# Patient Record
Sex: FEMALE | Race: WHITE | Employment: UNEMPLOYED | URBAN - NONMETROPOLITAN AREA
[De-identification: names, ages, dates, MRNs, and addresses within clinical notes are randomized per-mention and may not be internally consistent; named-entity substitution may affect disease eponyms.]

---

## 2017-01-11 ENCOUNTER — NURSE TRIAGE (OUTPATIENT)
Dept: ADMINISTRATIVE | Age: 57
End: 2017-01-11

## 2018-12-11 LAB
ALBUMIN SERPL-MCNC: 4.3 G/DL
ALP BLD-CCNC: 60 U/L
ALT SERPL-CCNC: 50 U/L
ANION GAP SERPL CALCULATED.3IONS-SCNC: 18 MMOL/L
AST SERPL-CCNC: 60 U/L
BASOPHILS ABSOLUTE: 0.07 /ΜL
BASOPHILS RELATIVE PERCENT: 1.1 %
BILIRUB SERPL-MCNC: 0.5 MG/DL (ref 0.1–1.4)
BUN BLDV-MCNC: 28 MG/DL
CALCIUM SERPL-MCNC: 9.8 MG/DL
CHLORIDE BLD-SCNC: 102 MMOL/L
CHOLESTEROL, TOTAL: 423 MG/DL
CHOLESTEROL/HDL RATIO: NORMAL
CO2: 24 MMOL/L
CREAT SERPL-MCNC: 2.5 MG/DL
EOSINOPHILS ABSOLUTE: 0.08 /ΜL
EOSINOPHILS RELATIVE PERCENT: 1.3 %
GFR CALCULATED: 21
GLUCOSE BLD-MCNC: 84 MG/DL
HCT VFR BLD CALC: 36.3 % (ref 36–46)
HDLC SERPL-MCNC: 54 MG/DL (ref 35–70)
HEMOGLOBIN: 11.6 G/DL (ref 12–16)
LDL CHOLESTEROL CALCULATED: NORMAL MG/DL (ref 0–160)
LYMPHOCYTES ABSOLUTE: 1.23 /ΜL
LYMPHOCYTES RELATIVE PERCENT: 20.1 %
MCH RBC QN AUTO: 32.8 PG
MCHC RBC AUTO-ENTMCNC: 32 G/DL
MCV RBC AUTO: 102.8 FL
MONOCYTES ABSOLUTE: 0.47 /ΜL
MONOCYTES RELATIVE PERCENT: 7.7 %
NEUTROPHILS ABSOLUTE: 4.25 /ΜL
NEUTROPHILS RELATIVE PERCENT: 69.5 %
PLATELET # BLD: 229 K/ΜL
PMV BLD AUTO: 11.8 FL
POTASSIUM SERPL-SCNC: 4.1 MMOL/L
RBC # BLD: 3.53 10^6/ΜL
SODIUM BLD-SCNC: 140 MMOL/L
T3 FREE: <0
T4 FREE: 0.27
TOTAL PROTEIN: 8.2
TRIGL SERPL-MCNC: 231 MG/DL
VLDLC SERPL CALC-MCNC: 46 MG/DL
WBC # BLD: 6.12 10^3/ML

## 2019-01-02 LAB
CREATININE, URINE: 25.4
MICROALBUMIN/CREAT 24H UR: 12.9 MG/G{CREAT}
MICROALBUMIN/CREAT UR-RTO: 50.8
MICROSCOPIC URINE: NORMAL

## 2019-01-28 LAB
ALBUMIN SERPL-MCNC: 4.1 G/DL
ALP BLD-CCNC: 71 U/L
ALT SERPL-CCNC: 18 U/L
ANION GAP SERPL CALCULATED.3IONS-SCNC: 15 MMOL/L
AST SERPL-CCNC: 27 U/L
BASOPHILS ABSOLUTE: 0.08 /ΜL
BASOPHILS RELATIVE PERCENT: 1.1 %
BILIRUB SERPL-MCNC: 0.4 MG/DL (ref 0.1–1.4)
BUN BLDV-MCNC: 25 MG/DL
CALCIUM SERPL-MCNC: 9.9 MG/DL
CHLORIDE BLD-SCNC: 103 MMOL/L
CO2: 27 MMOL/L
CREAT SERPL-MCNC: 1.9 MG/DL
EOSINOPHILS ABSOLUTE: 0.21 /ΜL
EOSINOPHILS RELATIVE PERCENT: 3 %
GFR CALCULATED: 29
GLUCOSE BLD-MCNC: 93 MG/DL
HCT VFR BLD CALC: 38.8 % (ref 36–46)
HEMOGLOBIN: 12.2 G/DL (ref 12–16)
LYMPHOCYTES ABSOLUTE: 1.4 /ΜL
LYMPHOCYTES RELATIVE PERCENT: 20 %
MCH RBC QN AUTO: 32.6 PG
MCHC RBC AUTO-ENTMCNC: 31.4 G/DL
MCV RBC AUTO: 103.7 FL
MONOCYTES ABSOLUTE: 0.61 /ΜL
MONOCYTES RELATIVE PERCENT: 8.7 %
NEUTROPHILS ABSOLUTE: 4.67 /ΜL
NEUTROPHILS RELATIVE PERCENT: 66.9 %
PLATELET # BLD: 240 K/ΜL
PMV BLD AUTO: 12 FL
POTASSIUM SERPL-SCNC: 4.5 MMOL/L
RBC # BLD: 3.74 10^6/ΜL
SODIUM BLD-SCNC: 140 MMOL/L
T4 FREE: 0.37
TOTAL PROTEIN: 8.4
WBC # BLD: 6.99 10^3/ML

## 2019-03-18 LAB
ALBUMIN SERPL-MCNC: 4.5 G/DL
ALP BLD-CCNC: 70 U/L
ALT SERPL-CCNC: 11 U/L
ANION GAP SERPL CALCULATED.3IONS-SCNC: 11 MMOL/L
AST SERPL-CCNC: 15 U/L
BILIRUB SERPL-MCNC: 0.3 MG/DL (ref 0.1–1.4)
BUN BLDV-MCNC: 24 MG/DL
CALCIUM SERPL-MCNC: 10 MG/DL
CHLORIDE BLD-SCNC: 105 MMOL/L
CHOLESTEROL, TOTAL: 157 MG/DL
CHOLESTEROL/HDL RATIO: 2.9
CO2: 27 MMOL/L
CREAT SERPL-MCNC: 1.7 MG/DL
GFR CALCULATED: NORMAL
GLUCOSE BLD-MCNC: 91 MG/DL
HDLC SERPL-MCNC: 53 MG/DL (ref 35–70)
LDL CHOLESTEROL CALCULATED: 87 MG/DL (ref 0–160)
POTASSIUM SERPL-SCNC: 4.6 MMOL/L
SODIUM BLD-SCNC: 143 MMOL/L
T4 FREE: 1.01
TOTAL PROTEIN: 6.9
TRIGL SERPL-MCNC: 83 MG/DL
VLDLC SERPL CALC-MCNC: 17 MG/DL

## 2019-04-17 ENCOUNTER — OFFICE VISIT (OUTPATIENT)
Dept: FAMILY MEDICINE CLINIC | Age: 59
End: 2019-04-17
Payer: MEDICARE

## 2019-04-17 VITALS
DIASTOLIC BLOOD PRESSURE: 78 MMHG | SYSTOLIC BLOOD PRESSURE: 124 MMHG | HEART RATE: 99 BPM | WEIGHT: 149.4 LBS | TEMPERATURE: 97.9 F | OXYGEN SATURATION: 99 %

## 2019-04-17 DIAGNOSIS — E78.2 MIXED HYPERLIPIDEMIA: ICD-10-CM

## 2019-04-17 DIAGNOSIS — E03.4 HYPOTHYROIDISM DUE TO ACQUIRED ATROPHY OF THYROID: ICD-10-CM

## 2019-04-17 DIAGNOSIS — Z11.4 SCREENING FOR HIV (HUMAN IMMUNODEFICIENCY VIRUS): ICD-10-CM

## 2019-04-17 DIAGNOSIS — Z23 NEED FOR TETANUS, DIPHTHERIA, AND ACELLULAR PERTUSSIS (TDAP) VACCINE IN PATIENT OF ADOLESCENT AGE OR OLDER: ICD-10-CM

## 2019-04-17 DIAGNOSIS — Z11.59 ENCOUNTER FOR HEPATITIS C SCREENING TEST FOR LOW RISK PATIENT: Primary | ICD-10-CM

## 2019-04-17 DIAGNOSIS — M17.0 PRIMARY OSTEOARTHRITIS OF BOTH KNEES: ICD-10-CM

## 2019-04-17 DIAGNOSIS — K21.00 GASTROESOPHAGEAL REFLUX DISEASE WITH ESOPHAGITIS: ICD-10-CM

## 2019-04-17 DIAGNOSIS — Z12.11 ENCOUNTER FOR SCREENING FOR MALIGNANT NEOPLASM OF COLON: ICD-10-CM

## 2019-04-17 DIAGNOSIS — Z78.0 POST-MENOPAUSAL: ICD-10-CM

## 2019-04-17 DIAGNOSIS — Z12.31 ENCOUNTER FOR SCREENING MAMMOGRAM FOR BREAST CANCER: ICD-10-CM

## 2019-04-17 PROCEDURE — 3017F COLORECTAL CA SCREEN DOC REV: CPT | Performed by: NURSE PRACTITIONER

## 2019-04-17 PROCEDURE — G8427 DOCREV CUR MEDS BY ELIG CLIN: HCPCS | Performed by: NURSE PRACTITIONER

## 2019-04-17 PROCEDURE — 99202 OFFICE O/P NEW SF 15 MIN: CPT | Performed by: NURSE PRACTITIONER

## 2019-04-17 PROCEDURE — G8421 BMI NOT CALCULATED: HCPCS | Performed by: NURSE PRACTITIONER

## 2019-04-17 PROCEDURE — 4004F PT TOBACCO SCREEN RCVD TLK: CPT | Performed by: NURSE PRACTITIONER

## 2019-04-17 RX ORDER — CALCIUM CARBONATE 200(500)MG
1 TABLET,CHEWABLE ORAL PRN
COMMUNITY

## 2019-04-17 RX ORDER — LEVOTHYROXINE SODIUM 0.1 MG/1
100 TABLET ORAL DAILY
COMMUNITY
Start: 2019-03-30 | End: 2019-04-17 | Stop reason: SDUPTHER

## 2019-04-17 RX ORDER — OMEPRAZOLE 20 MG/1
20 CAPSULE, DELAYED RELEASE ORAL PRN
COMMUNITY
Start: 2019-03-30 | End: 2019-12-02 | Stop reason: SDUPTHER

## 2019-04-17 RX ORDER — LEVOTHYROXINE SODIUM 0.1 MG/1
100 TABLET ORAL DAILY
Qty: 90 TABLET | Refills: 1 | Status: SHIPPED | OUTPATIENT
Start: 2019-04-17 | End: 2019-08-02 | Stop reason: SDUPTHER

## 2019-04-17 RX ORDER — ATORVASTATIN CALCIUM 10 MG/1
10 TABLET, FILM COATED ORAL DAILY
COMMUNITY
Start: 2019-03-30 | End: 2019-07-01 | Stop reason: SDUPTHER

## 2019-04-17 ASSESSMENT — ENCOUNTER SYMPTOMS
EYE ITCHING: 0
SHORTNESS OF BREATH: 0
EYE PAIN: 0
CHEST TIGHTNESS: 0
EYE REDNESS: 0
COUGH: 1
GASTROINTESTINAL NEGATIVE: 1
WHEEZING: 0
ALLERGIC/IMMUNOLOGIC NEGATIVE: 1

## 2019-04-17 ASSESSMENT — PATIENT HEALTH QUESTIONNAIRE - PHQ9
2. FEELING DOWN, DEPRESSED OR HOPELESS: 0
1. LITTLE INTEREST OR PLEASURE IN DOING THINGS: 0
SUM OF ALL RESPONSES TO PHQ9 QUESTIONS 1 & 2: 0
SUM OF ALL RESPONSES TO PHQ QUESTIONS 1-9: 0
SUM OF ALL RESPONSES TO PHQ QUESTIONS 1-9: 0

## 2019-04-17 NOTE — PROGRESS NOTES
[address] [facility]  [address] [place] [address]. Fozia Simon [address]  Dept: [phone]  Dept Fax: [phone]  Loc: [phone]     Visit Date:  4/17/2019    Patient:  Miguel Campbell  YOB: 1960    HPI:     Chief Complaint   Patient presents with    New Patient     Establish Care    Thyroid Problem     Medication Refill       HPI  Here for establishment  Hx of thyroid issues  tsh and t4 have been stable  Was working previously with Dr Coral Lux and DR Melany Fragoso   On disability for knee OA and frequent falls    Has arthritis of knees  Has heartburn if eating tomato products  Using GERD medications for some time now  Heartburn at least 3x per month; knows her triggers. No preventative care x 9 years since receiving her disability. Worked in [employer]  Has been disabled for some  Has quit driving  Dtr has bought her a cane and walker due to her frequent falls. No falls in the last month. REcently moved to [address] [place] [address] to be closer to family. Hx of kidney stones    Appetite is good  Bowels are moving well  No hysterectomy; 5 years ago was last pap    Medications    Current Outpatient Medications:     atorvastatin (LIPITOR) 10 MG tablet, Take 10 mg by mouth daily, Disp: , Rfl:     omeprazole (PRILOSEC) 20 MG delayed release capsule, Take 20 mg by mouth as needed, Disp: , Rfl:     diphenhydrAMINE-APAP, sleep, (TYLENOL PM EXTRA STRENGTH PO), Take by mouth nightly, Disp: , Rfl:     calcium carbonate (TUMS) 500 MG chewable tablet, Take 1 tablet by mouth as needed for Heartburn, Disp: , Rfl:     levothyroxine (SYNTHROID) 100 MCG tablet, Take 1 tablet by mouth daily, Disp: 90 tablet, Rfl: 1    Ketoprofen 10 % CREA, Apply 1 g topically nightly, Disp: 1 Tube, Rfl: 5    The patient is allergic to tramadol. Past Medical History  Nickolas Keane  has a past medical history of Anemia, Arthritis, and Kidney stone.     Past Surgical History  The patient  has a past surgical history that includes Bunionectomy (Bilateral) and Finger amputation (Left). Family History  This patient's family history includes Brain Cancer in her sister; Breast Cancer in her mother; Hypertension in her father; Other in her father; Stroke in her father. Social History   Mahendra  reports that she has been smoking cigarettes. She has been smoking about 1.00 pack per day. She has never used smokeless tobacco.    Health Maintenance:    Health Maintenance   Topic Date Due    Hepatitis C screen  1960    Pneumococcal 0-64 years Vaccine (1 of 1 - PPSV23) 09/01/1964    HIV screen  09/01/1973    DTaP/Tdap/Td vaccine (1 - Tdap) 09/01/1977    Cervical cancer screen  09/01/1979    Breast cancer screen  09/01/1998    Shingles Vaccine (1 of 2) 09/01/2008    Colon cancer screen colonoscopy  09/01/2008    Flu vaccine (Season Ended) 09/01/2019    Lipid screen  03/18/2024       Subjective:       Review of Systems   Constitutional: Positive for fatigue. Negative for activity change, appetite change and fever. HENT: Negative. Eyes: Negative for pain, redness and itching. Respiratory: Positive for cough. Negative for chest tightness, shortness of breath and wheezing. Cardiovascular: Positive for leg swelling. Negative for chest pain and palpitations. Gastrointestinal: Negative. Endocrine: Negative. Genitourinary: Positive for frequency. Negative for difficulty urinating. Musculoskeletal: Positive for arthralgias, gait problem and myalgias. Negative for neck pain and neck stiffness. Skin: Negative. Allergic/Immunologic: Negative. Neurological: Positive for light-headedness. Negative for dizziness, weakness and headaches. Hematological: Negative for adenopathy. Does not bruise/bleed easily. Psychiatric/Behavioral: Negative.         Objective:     /78 (Site: Right Upper Arm, Position: Sitting, Cuff Size: Small Adult)   Pulse 99

## 2019-04-17 NOTE — PATIENT INSTRUCTIONS
Patient Education        Arthritis: Care Instructions  Your Care Instructions  Arthritis, also called osteoarthritis, is a breakdown of the cartilage that cushions your joints. When the cartilage wears down, your bones rub against each other. This causes pain and stiffness. Many people have some arthritis as they age. Arthritis most often affects the joints of the spine, hands, hips, knees, or feet. You can take simple measures to protect your joints, ease your pain, and help you stay active. Follow-up care is a key part of your treatment and safety. Be sure to make and go to all appointments, and call your doctor if you are having problems. It's also a good idea to know your test results and keep a list of the medicines you take. How can you care for yourself at home? · Stay at a healthy weight. Being overweight puts extra strain on your joints. · Talk to your doctor or physical therapist about exercises that will help ease joint pain. ? Stretch. You may enjoy gentle forms of yoga to help keep your joints and muscles flexible. ? Walk instead of jog. Other types of exercise that are less stressful on the joints include riding a bicycle, swimming, sita chi, or water exercise. ? Lift weights. Strong muscles help reduce stress on your joints. Stronger thigh muscles, for example, take some of the stress off of the knees and hips. Learn the right way to lift weights so you do not make joint pain worse. · Take your medicines exactly as prescribed. Call your doctor if you think you are having a problem with your medicine. · Take pain medicines exactly as directed. ? If the doctor gave you a prescription medicine for pain, take it as prescribed. ? If you are not taking a prescription pain medicine, ask your doctor if you can take an over-the-counter medicine. · Use a cane, crutch, walker, or another device if you need help to get around. These can help rest your joints.  You also can use other things to make life easier, such as a higher toilet seat and padded handles on kitchen utensils. · Do not sit in low chairs, which can make it hard to get up. · Put heat or cold on your sore joints as needed. Use whichever helps you most. You also can take turns with hot and cold packs. ? Apply heat 2 or 3 times a day for 20 to 30 minutes--using a heating pad, hot shower, or hot pack--to relieve pain and stiffness. ? Put ice or a cold pack on your sore joint for 10 to 20 minutes at a time. Put a thin cloth between the ice and your skin. When should you call for help? Call your doctor now or seek immediate medical care if:    · You have sudden swelling, warmth, or pain in any joint.     · You have joint pain and a fever or rash.     · You have such bad pain that you cannot use a joint.    Watch closely for changes in your health, and be sure to contact your doctor if:    · You have mild joint symptoms that continue even with more than 6 weeks of care at home.     · You have stomach pain or other problems with your medicine. Where can you learn more? Go to https://[employer].McPhy. org and sign in to your Extra[employer]ie account. Enter F615 in the [employer] box to learn more about \"Arthritis: Care Instructions. \"     If you do not have an account, please click on the \"Sign Up Now\" link. Current as of: Melony 10, 2018  Content Version: 11.9  © 2605-2125 [employer]. Care instructions adapted under license by [facility]. If you have questions about a medical condition or this instruction, always ask your healthcare professional. [place] [address] any warranty or liability for your use of this information. Patient Education        Thyroid Hormone Tests: About This Test  What is it? Thyroid hormone tests are blood tests that check how well your thyroid gland is working.  The thyroid gland is a butterfly-shaped gland that lies in front of your windpipe (trachea), just below your voice box (larynx). The thyroid gland makes hormones that control the way your body uses energy (metabolism). This test will give your doctor information about your thyroid hormone levels. You may have hyperthyroidism when your thyroid makes too much thyroid hormone. You may have hypothyroidism when your body does not make enough thyroid hormone. Why is this test done? Thyroid hormone tests are done to:  · Find the cause of an abnormal thyroid-stimulating hormone (TSH) test, which is a blood test that checks for thyroid gland problems. · Check how well treatment for thyroid disease is working. · Test a  to find out if his or her thyroid gland is working as it should. How can you prepare for the test?  Talk to your doctor about all your health conditions before the test. For example, tell your doctor about all medicines you take. If you are taking thyroid medicines, tell your doctor when you took your last dose. You may need to stop taking thyroid medicines for a short time before having this test.  What happens during the test?  Blood test  A health professional will take a sample of your blood. Heel stick  A 's heel is pricked with a sharp, short needle (lancet). Then, several drops of blood are collected for the test. Your child may have a tiny bruise where his or her heel was pricked. What happens after the test?  You can go back to your usual activities right away. When should you call for help? Watch closely for changes in your health, and be sure to contact your doctor if you have any questions about this test.  Follow-up care is a key part of your treatment and safety. Be sure to make and go to all appointments, and call your doctor if you are having problems. It's also a good idea to keep a list of the medicines you take. Ask your doctor when you can expect to have your test results. Where can you learn more? Go to https://christine.health-partners. org and sign in to your Vide[employer] account. Enter 0699 [id number] in the [employer] box to learn more about \"Thyroid Hormone Tests: About This Test.\"     If you do not have an account, please click on the \"Sign Up Now\" link. Current as of: March 14, 2018  Content Version: 11.9  © 9235-6606 [employer]. Care instructions adapted under license by [facility] ([facility]). If you have questions about a medical condition or this instruction, always ask your healthcare professional. [address] any warranty or liability for your use of this information.

## 2019-07-01 DIAGNOSIS — E78.2 MIXED HYPERLIPIDEMIA: Primary | ICD-10-CM

## 2019-07-01 RX ORDER — ATORVASTATIN CALCIUM 10 MG/1
10 TABLET, FILM COATED ORAL DAILY
Qty: 30 TABLET | Refills: 5 | Status: SHIPPED | OUTPATIENT
Start: 2019-07-01 | End: 2019-08-02 | Stop reason: SDUPTHER

## 2019-07-19 ENCOUNTER — NURSE ONLY (OUTPATIENT)
Dept: FAMILY MEDICINE CLINIC | Age: 59
End: 2019-07-19
Payer: MEDICARE

## 2019-07-19 DIAGNOSIS — E78.2 MIXED HYPERLIPIDEMIA: ICD-10-CM

## 2019-07-19 DIAGNOSIS — Z11.4 SCREENING FOR HIV (HUMAN IMMUNODEFICIENCY VIRUS): ICD-10-CM

## 2019-07-19 DIAGNOSIS — Z11.59 ENCOUNTER FOR HEPATITIS C SCREENING TEST FOR LOW RISK PATIENT: ICD-10-CM

## 2019-07-19 DIAGNOSIS — E03.4 HYPOTHYROIDISM DUE TO ACQUIRED ATROPHY OF THYROID: ICD-10-CM

## 2019-07-19 LAB
ALBUMIN SERPL-MCNC: 4.4 G/DL (ref 3.5–5.1)
ALP BLD-CCNC: 103 U/L (ref 38–126)
ALT SERPL-CCNC: 9 U/L (ref 11–66)
AST SERPL-CCNC: 14 U/L (ref 5–40)
BILIRUB SERPL-MCNC: 0.3 MG/DL (ref 0.3–1.2)
BILIRUBIN DIRECT: < 0.2 MG/DL (ref 0–0.3)
CHOLESTEROL, TOTAL: 206 MG/DL (ref 100–199)
HDLC SERPL-MCNC: 61 MG/DL
HEPATITIS C ANTIBODY: NEGATIVE
LDL CHOLESTEROL CALCULATED: 121 MG/DL
T4 FREE: 0.96 NG/DL (ref 0.93–1.76)
TOTAL PROTEIN: 7.3 G/DL (ref 6.1–8)
TRIGL SERPL-MCNC: 121 MG/DL (ref 0–199)
TSH SERPL DL<=0.05 MIU/L-ACNC: 27.41 UIU/ML (ref 0.4–4.2)

## 2019-07-19 PROCEDURE — 36415 COLL VENOUS BLD VENIPUNCTURE: CPT | Performed by: NURSE PRACTITIONER

## 2019-07-21 LAB — HIV-2 AB: NEGATIVE

## 2019-08-02 ENCOUNTER — OFFICE VISIT (OUTPATIENT)
Dept: FAMILY MEDICINE CLINIC | Age: 59
End: 2019-08-02
Payer: MEDICARE

## 2019-08-02 VITALS
OXYGEN SATURATION: 99 % | WEIGHT: 154.8 LBS | HEART RATE: 96 BPM | TEMPERATURE: 97.5 F | SYSTOLIC BLOOD PRESSURE: 140 MMHG | DIASTOLIC BLOOD PRESSURE: 72 MMHG

## 2019-08-02 DIAGNOSIS — K21.00 GASTROESOPHAGEAL REFLUX DISEASE WITH ESOPHAGITIS: ICD-10-CM

## 2019-08-02 DIAGNOSIS — E78.2 MIXED HYPERLIPIDEMIA: ICD-10-CM

## 2019-08-02 DIAGNOSIS — E03.4 HYPOTHYROIDISM DUE TO ACQUIRED ATROPHY OF THYROID: ICD-10-CM

## 2019-08-02 DIAGNOSIS — M17.0 PRIMARY OSTEOARTHRITIS OF BOTH KNEES: ICD-10-CM

## 2019-08-02 DIAGNOSIS — M79.604 MUSCULOSKELETAL LEG PAIN, RIGHT: ICD-10-CM

## 2019-08-02 DIAGNOSIS — M54.12 CERVICAL RADICULOPATHY: Primary | ICD-10-CM

## 2019-08-02 PROCEDURE — G8421 BMI NOT CALCULATED: HCPCS | Performed by: NURSE PRACTITIONER

## 2019-08-02 PROCEDURE — 3017F COLORECTAL CA SCREEN DOC REV: CPT | Performed by: NURSE PRACTITIONER

## 2019-08-02 PROCEDURE — 4004F PT TOBACCO SCREEN RCVD TLK: CPT | Performed by: NURSE PRACTITIONER

## 2019-08-02 PROCEDURE — G8427 DOCREV CUR MEDS BY ELIG CLIN: HCPCS | Performed by: NURSE PRACTITIONER

## 2019-08-02 PROCEDURE — 99214 OFFICE O/P EST MOD 30 MIN: CPT | Performed by: NURSE PRACTITIONER

## 2019-08-02 RX ORDER — CELECOXIB 100 MG/1
100 CAPSULE ORAL 2 TIMES DAILY
Qty: 60 CAPSULE | Refills: 1 | Status: SHIPPED | OUTPATIENT
Start: 2019-08-02 | End: 2019-09-23 | Stop reason: SDUPTHER

## 2019-08-02 RX ORDER — LEVOTHYROXINE SODIUM 0.1 MG/1
100 TABLET ORAL DAILY
Qty: 90 TABLET | Refills: 1 | Status: SHIPPED | OUTPATIENT
Start: 2019-08-02 | End: 2020-02-21 | Stop reason: SDUPTHER

## 2019-08-02 RX ORDER — ATORVASTATIN CALCIUM 10 MG/1
10 TABLET, FILM COATED ORAL DAILY
Qty: 90 TABLET | Refills: 1 | Status: SHIPPED | OUTPATIENT
Start: 2019-08-02 | End: 2020-05-11 | Stop reason: SDUPTHER

## 2019-08-02 RX ORDER — CYCLOBENZAPRINE HCL 5 MG
5 TABLET ORAL NIGHTLY PRN
Qty: 30 TABLET | Refills: 0 | Status: SHIPPED | OUTPATIENT
Start: 2019-08-02 | End: 2019-08-12

## 2019-08-02 ASSESSMENT — ENCOUNTER SYMPTOMS
CONSTIPATION: 0
EYE PAIN: 0
CHEST TIGHTNESS: 0
NAUSEA: 0
DIARRHEA: 0
SORE THROAT: 0
APNEA: 0
PHOTOPHOBIA: 0
FACIAL SWELLING: 0
SHORTNESS OF BREATH: 0
BACK PAIN: 1
EYE DISCHARGE: 0
ABDOMINAL DISTENTION: 0

## 2019-08-02 NOTE — PROGRESS NOTES
[facility]  [address] [place] [address]. [facility] [address]  Dept: [phone]  Dept Fax: [phone]  Loc: [phone]     Visit Date:  8/2/2019    Patient:  Karen Moore  YOB: 1960    HPI:     Chief Complaint   Patient presents with    Other     4 Month Follow Up    Leg Pain     right leg, same issues as last visit    Hand Pain     left, has no        HPI  Here for follow up on her chronic conditions. Right thigh pain ongoing  Worse at night  Muscle is tight, continues to be withdrawn in appearance  Pain in right  Hip and right knee with laying down in bed. Pt has been sleeping in the chair  Epsom salt warm towels does help the pain,    New numbness, tingling and pain of the left 3-5th fingers. Previous injury  This pain and weakness is new. Cannot hold cup in the left hand  Pain with grasping her walker and her cane  Cannot raise arm above head. Pain worse in the morning with first getting up, does improve with standing.      Continues to have moderate to severe kyphosis    Medications    Current Outpatient Medications:     atorvastatin (LIPITOR) 10 MG tablet, Take 1 tablet by mouth daily, Disp: 90 tablet, Rfl: 1    levothyroxine (SYNTHROID) 100 MCG tablet, Take 1 tablet by mouth daily, Disp: 90 tablet, Rfl: 1    celecoxib (CELEBREX) 100 MG capsule, Take 1 capsule by mouth 2 times daily, Disp: 60 capsule, Rfl: 1    cyclobenzaprine (FLEXERIL) 5 MG tablet, Take 1 tablet by mouth nightly as needed for Muscle spasms, Disp: 30 tablet, Rfl: 0    omeprazole (PRILOSEC) 20 MG delayed release capsule, Take 20 mg by mouth as needed, Disp: , Rfl:     diphenhydrAMINE-APAP, sleep, (TYLENOL PM EXTRA STRENGTH PO), Take by mouth nightly, Disp: , Rfl:     calcium carbonate (TUMS) 500 MG chewable tablet, Take 1 tablet by mouth as needed for Heartburn, Disp: , Rfl:     Ketoprofen 10 % CREA, Apply 1 g topically nightly, Disp: 1 Tube, Rfl: 5    The patient is allergic to tramadol. Past Medical History  Rachele Cee  has a past medical history of Anemia, Arthritis, and Kidney stone. Past Surgical History  The patient  has a past surgical history that includes Bunionectomy (Bilateral) and Finger amputation (Left). Family History  This patient's family history includes Brain Cancer in her sister; Breast Cancer in her mother; Hypertension in her father; Other in her father; Stroke in her father. Social History   Rachele Cee  reports that she has been smoking cigarettes. She has been smoking about 1.00 pack per day. She has never used smokeless tobacco.    Health Maintenance:    Health Maintenance   Topic Date Due    Pneumococcal 0-64 years Vaccine (1 of 1 - PPSV23) 09/01/1964    DTaP/Tdap/Td vaccine (1 - Tdap) 09/01/1977    Cervical cancer screen  09/01/1979    Breast cancer screen  09/01/1998    Shingles Vaccine (1 of 2) 09/01/2008    Colon cancer screen colonoscopy  09/01/2008    Flu vaccine (1) 09/01/2019    Lipid screen  07/19/2024    Hepatitis C screen  Completed    HIV screen  Completed       Subjective:       Review of Systems   Constitutional: Positive for activity change and appetite change. Negative for diaphoresis, fatigue and fever. HENT: Negative for congestion, facial swelling, postnasal drip and sore throat. Eyes: Negative for photophobia, pain, discharge and visual disturbance. Respiratory: Negative for apnea, chest tightness and shortness of breath. Cardiovascular: Negative for chest pain and leg swelling. Gastrointestinal: Negative for abdominal distention, constipation, diarrhea and nausea. Endocrine: Negative for cold intolerance and heat intolerance. Genitourinary: Negative for dysuria, flank pain, frequency and urgency. Musculoskeletal: Positive for arthralgias, back pain, joint swelling, myalgias and neck stiffness. Negative for neck pain. Skin: Negative for pallor and rash. patientinstructions. Discussed use, benefit, and side effects of prescribed medications. All patient questions answered. Pt voiced understanding. Reviewed health maintenance.        Electronically signed EMORY Anderson CNP on 8/2/2019 at 1:06 PM

## 2019-08-02 NOTE — PATIENT INSTRUCTIONS
Patient Education        Well Visit, Women 48 to 72: Care Instructions  Your Care Instructions    Physical exams can help you stay healthy. Your doctor has checked your overall health and may have suggested ways to take good care of yourself. He or she also may have recommended tests. At home, you can help prevent illness with healthy eating, regular exercise, and other steps. Follow-up care is a key part of your treatment and safety. Be sure to make and go to all appointments, and call your doctor if you are having problems. It's also a good idea to know your test results and keep a list of the medicines you take. How can you care for yourself at home? · Reach and stay at a healthy weight. This will lower your risk for many problems, such as obesity, diabetes, heart disease, and high blood pressure. · Get at least 30 minutes of exercise on most days of the week. Walking is a good choice. You also may want to do other activities, such as running, swimming, cycling, or playing tennis or team sports. · Do not smoke. Smoking can make health problems worse. If you need help quitting, talk to your doctor about stop-smoking programs and medicines. These can increase your chances of quitting for good. · Protect your skin from too much sun. When you're outdoors from 10 a.m. to 4 p.m., stay in the shade or cover up with clothing and a hat with a wide brim. Wear sunglasses that block UV rays. Even when it's cloudy, put broad-spectrum sunscreen (SPF 30 or higher) on any exposed skin. · See a dentist one or two times a year for checkups and to have your teeth cleaned. · Wear a seat belt in the car. Follow your doctor's advice about when to have certain tests. These tests can spot problems early. · Cholesterol. Your doctor will tell you how often to have this done based on your age, family history, or other things that can increase your risk for heart attack and stroke. · Blood pressure.  Have your blood pressure have blood tests and to make sure you are doing well. Follow-up care is a key part of your treatment and safety. Be sure to make and go to all appointments, and call your doctor if you are having problems. It's also a good idea to know your test results and keep a list of the medicines you take. How can you care for yourself at home? · Take your thyroid hormone medicine exactly as prescribed. Call your doctor if you think you are having a problem with your medicine. Most people do not have side effects if they take the right amount of medicine regularly. ? Take the medicine 30 minutes before breakfast, and do not take it with calcium, vitamins, or iron. ? Do not take extra doses of your thyroid medicine. It will not help you get better any faster, and it may cause side effects. ? If you forget to take a dose, do NOT take a double dose of medicine. Take your usual dose the next day. · Tell your doctor about all prescription, herbal, or over-the-counter products you take. · Take care of yourself. Eat a healthy diet, get enough sleep, and get regular exercise. When should you call for help? Call 911 anytime you think you may need emergency care. For example, call if:    · You passed out (lost consciousness).     · You have severe trouble breathing.     · You have a very slow heartbeat (less than 60 beats a minute).     · You have a low body temperature (95°F or below).    Call your doctor now or seek immediate medical care if:    · You feel tired, sluggish, or weak.     · You have trouble remembering things or concentrating.     · You do not begin to feel better 2 weeks after starting your medicine.    Watch closely for changes in your health, and be sure to contact your doctor if you have any problems. Where can you learn more? Go to https://chpeanastasiaeb.[employer]. org and sign in to your [employer] account.  Enter Y696 in the enStage box to learn more about \"Hypothyroidism: Care some people. If your symptoms are worse after you eat a certain food, you may want to stop eating that food to see if your symptoms get better. · Do not smoke or chew tobacco. Smoking can make GERD worse. If you need help quitting, talk to your doctor about stop-smoking programs and medicines. These can increase your chances of quitting for good. · If you have GERD symptoms at night, raise the head of your bed 6 to 8 inches by putting the frame on blocks or placing a foam wedge under the head of your mattress. (Adding extra pillows does not work.)  · Do not wear tight clothing around your middle. · Lose weight if you need to. Losing just 5 to 10 pounds can help. When should you call for help? Call your doctor now or seek immediate medical care if:    · You have new or different belly pain.     · Your stools are black and tarlike or have streaks of blood.    Watch closely for changes in your health, and be sure to contact your doctor if:    · Your symptoms have not improved after 2 days.     · Food seems to catch in your throat or chest.   Where can you learn more? Go to https://[employer].LAF[employer]O. org and sign in to your [employer] account. Enter Q851 in the Body & Soul box to learn more about \"Gastroesophageal Reflux Disease (GERD): Care Instructions. \"     If you do not have an account, please click on the \"Sign Up Now\" link. Current as of: November 7, 2018  Content Version: 12.0  © 6020-6211 [employer]. Care instructions adapted under license by [facility] ([facility]). If you have questions about a medical condition or this instruction, always ask your healthcare professional. [place] [address] any warranty or liability for your use of this information. Patient Education        Knee Arthritis: Exercises  Your Care Instructions  Here are some examples of exercises for knee arthritis. Start each exercise slowly.  Ease off the exercise if you start to have knees straight. If your kneecap is uncomfortable, roll up a washcloth and put it under your leg just above your kneecap. 2. Lift the foot of your affected leg by bending the knee so that you bring the foot up toward your buttock. If this motion hurts, try it without bending your knee quite as far. This may help you avoid any painful motion. 3. Slowly move your leg up and down. 4. Repeat 8 to 12 times. 5. Switch legs and repeat steps 1 through 4, even if only one knee is sore. Quadriceps stretch (facedown)    1. Lie flat on your stomach, and rest your face on the floor. 2. Wrap a towel or belt strap around the lower part of your affected leg. Then use the towel or belt strap to slowly pull your heel toward your buttock until you feel a stretch. 3. Hold for about 15 to 30 seconds, then relax your leg against the towel or belt strap. 4. Repeat 2 to 4 times. 5. Switch legs and repeat steps 1 through 4, even if only one knee is sore. Stationary exercise bike    1. If you do not have a stationary exercise bike at home, you can find one to ride at your local health club or community center. 2. Adjust the height of the bike seat so that your knee is slightly bent when your leg is extended downward. If your knee hurts when the pedal reaches the top, you can raise the seat so that your knee does not bend as much. 3. Start slowly. At first, try to do 5 to 10 minutes of cycling with little to no resistance. Then increase your time and the resistance bit by bit until you can do 20 to 30 minutes without pain. 4. If you start to have pain, rest your knee until your pain gets back to the level that is normal for you. Or cycle for less time or with less effort. Follow-up care is a key part of your treatment and safety. Be sure to make and go to all appointments, and call your doctor if you are having problems. It's also a good idea to know your test results and keep a list of the medicines you take.   Where can if you can take an over-the-counter medicine. · Try using a heating pad on a low or medium setting for 15 to 20 minutes every 2 or 3 hours. Try a warm shower in place of one session with the heating pad. You can also buy single-use heat wraps that last up to 8 hours. · You can also try an ice pack for 10 to 15 minutes every 2 to 3 hours. There isn't strong evidence that either heat or ice will help. But you can try them to see if they help you. · Don't spend too long in one position. Take short breaks to move around and change positions. · Wear a seat belt and shoulder harness when you are in a car. · Sleep with a pillow under your head and neck that keeps your neck straight. · If you were given a neck brace (cervical collar) to limit neck motion, wear it as instructed for as many days as your doctor tells you to. Do not wear it longer than you were told to. Wearing a brace for too long can lead to neck stiffness and can weaken the neck muscles. · Follow your doctor's instructions for gentle neck-stretching exercises. · Do not smoke. Smoking can slow healing of your discs. If you need help quitting, talk to your doctor about stop-smoking programs and medicines. These can increase your chances of quitting for good. · Avoid strenuous work or exercise until your doctor says it is okay. When should you call for help? Call 911 anytime you think you may need emergency care. For example, call if:    · You are unable to move an arm or a leg at all.   [facility] your doctor now or seek immediate medical care if:    · You have new or worse symptoms in your arms, legs, chest, belly, or buttocks. Symptoms may include:  ? Numbness or tingling. ? Weakness. ? Pain.     · You lose bladder or bowel control.    Watch closely for changes in your health, and be sure to contact your doctor if:    · You are not getting better as expected. Where can you learn more? Go to https://christine.healthMovableInk. org and sign in to

## 2019-08-13 ENCOUNTER — HOSPITAL ENCOUNTER (OUTPATIENT)
Age: 59
Discharge: HOME | End: 2019-08-13
Payer: MEDICARE

## 2019-08-13 ENCOUNTER — HOSPITAL ENCOUNTER (OUTPATIENT)
Dept: GENERAL RADIOLOGY | Age: 59
Discharge: HOME | End: 2019-08-13
Payer: MEDICARE

## 2019-08-13 ENCOUNTER — TELEPHONE (OUTPATIENT)
Dept: FAMILY MEDICINE CLINIC | Age: 59
End: 2019-08-13

## 2019-08-13 DIAGNOSIS — M79.604 MUSCULOSKELETAL LEG PAIN, RIGHT: ICD-10-CM

## 2019-08-13 DIAGNOSIS — M54.12 CERVICAL RADICULOPATHY: ICD-10-CM

## 2019-08-13 PROCEDURE — 73552 X-RAY EXAM OF FEMUR 2/>: CPT

## 2019-08-13 PROCEDURE — 72040 X-RAY EXAM NECK SPINE 2-3 VW: CPT

## 2019-08-13 PROCEDURE — 73502 X-RAY EXAM HIP UNI 2-3 VIEWS: CPT

## 2019-08-19 NOTE — TELEPHONE ENCOUNTER
I called the patient's daughter Vilma Beckman and received voicemail. I left a detailed message regarding Dayne Farris' response and if they would like to proceed or have any questions she can give our office a call back.

## 2019-08-19 NOTE — TELEPHONE ENCOUNTER
Would suggest MRI of neck and physical therapy if pt is interested. There is some suggestion of nerve impingement of the cervical spine. MRI would show the depth of degeneration, and if nerves are impinged.

## 2019-09-23 DIAGNOSIS — M17.0 PRIMARY OSTEOARTHRITIS OF BOTH KNEES: ICD-10-CM

## 2019-09-23 RX ORDER — CELECOXIB 100 MG/1
100 CAPSULE ORAL 2 TIMES DAILY
Qty: 180 CAPSULE | Refills: 1 | Status: SHIPPED
Start: 2019-09-23 | End: 2020-02-07 | Stop reason: SINTOL

## 2019-10-16 ENCOUNTER — HOSPITAL ENCOUNTER (OUTPATIENT)
Dept: WOMENS IMAGING | Age: 59
Discharge: HOME | End: 2019-10-16
Payer: MEDICARE

## 2019-10-16 ENCOUNTER — ANCILLARY ORDERS (OUTPATIENT)
Dept: FAMILY MEDICINE CLINIC | Age: 59
End: 2019-10-16

## 2019-10-16 DIAGNOSIS — Z78.0 POST-MENOPAUSAL: ICD-10-CM

## 2019-10-16 DIAGNOSIS — M85.89 OSTEOPENIA OF MULTIPLE SITES: Primary | ICD-10-CM

## 2019-10-16 PROCEDURE — 77080 DXA BONE DENSITY AXIAL: CPT

## 2019-10-18 ENCOUNTER — TELEPHONE (OUTPATIENT)
Dept: FAMILY MEDICINE CLINIC | Age: 59
End: 2019-10-18

## 2019-10-18 RX ORDER — ALENDRONATE SODIUM 70 MG/1
70 TABLET ORAL
Qty: 12 TABLET | Refills: 1 | Status: SHIPPED | OUTPATIENT
Start: 2019-10-18 | End: 2020-02-07 | Stop reason: SDUPTHER

## 2019-12-02 DIAGNOSIS — K21.00 GASTROESOPHAGEAL REFLUX DISEASE WITH ESOPHAGITIS: Primary | ICD-10-CM

## 2019-12-02 RX ORDER — OMEPRAZOLE 20 MG/1
20 CAPSULE, DELAYED RELEASE ORAL DAILY
Qty: 90 CAPSULE | Refills: 1 | Status: SHIPPED | OUTPATIENT
Start: 2019-12-02 | End: 2020-03-11 | Stop reason: SDUPTHER

## 2020-01-06 ENCOUNTER — TELEPHONE (OUTPATIENT)
Dept: FAMILY MEDICINE CLINIC | Age: 60
End: 2020-01-06

## 2020-01-06 NOTE — TELEPHONE ENCOUNTER
Adams Giron put in the orders for blood work. I faxed it to the [facility] on [facility] in [address] and received a fax confirmation back.

## 2020-01-06 NOTE — TELEPHONE ENCOUNTER
The patient called and was going to get blood work done on Thursday. I don't see any orders in her chart. Can you put orders in the you want the patient to have done?

## 2020-01-09 LAB
ALBUMIN SERPL-MCNC: 4.4 G/DL
ALP BLD-CCNC: 124 U/L
ALT SERPL-CCNC: 19 U/L
ANION GAP SERPL CALCULATED.3IONS-SCNC: 13 MMOL/L
AST SERPL-CCNC: 21 U/L
BASOPHILS ABSOLUTE: 0.1 /ΜL
BASOPHILS RELATIVE PERCENT: 1.4 %
BILIRUB SERPL-MCNC: 0.3 MG/DL (ref 0.1–1.4)
BUN BLDV-MCNC: 29 MG/DL
CALCIUM SERPL-MCNC: 9.6 MG/DL
CHLORIDE BLD-SCNC: 106 MMOL/L
CHOLESTEROL, TOTAL: 156 MG/DL
CHOLESTEROL/HDL RATIO: 2.6
CO2: 23 MMOL/L
CREAT SERPL-MCNC: 1.42 MG/DL
EOSINOPHILS ABSOLUTE: 0.2 /ΜL
EOSINOPHILS RELATIVE PERCENT: 2.2 %
GFR CALCULATED: 38
GLUCOSE BLD-MCNC: 76 MG/DL
HCT VFR BLD CALC: 39.1 % (ref 36–46)
HDLC SERPL-MCNC: 59 MG/DL (ref 35–70)
HEMOGLOBIN: 12.9 G/DL (ref 12–16)
LDL CHOLESTEROL CALCULATED: 78 MG/DL (ref 0–160)
LYMPHOCYTES ABSOLUTE: 1.9 /ΜL
LYMPHOCYTES RELATIVE PERCENT: 17.9 %
MCH RBC QN AUTO: 29.9 PG
MCHC RBC AUTO-ENTMCNC: 32.9 G/DL
MCV RBC AUTO: 91 FL
MONOCYTES ABSOLUTE: 0.8 /ΜL
MONOCYTES RELATIVE PERCENT: 7.7 %
NEUTROPHILS ABSOLUTE: 7.4 /ΜL
NEUTROPHILS RELATIVE PERCENT: 70.8 %
PDW BLD-RTO: 15.7 %
PLATELET # BLD: 302 K/ΜL
PMV BLD AUTO: 9.1 FL
POTASSIUM SERPL-SCNC: 4.9 MMOL/L
RBC # BLD: 4.3 10^6/ΜL
SODIUM BLD-SCNC: 142 MMOL/L
TOTAL PROTEIN: 7.6
TRIGL SERPL-MCNC: 93 MG/DL
VITAMIN D 25-HYDROXY: 32.3
VITAMIN D2, 25 HYDROXY: NORMAL
VITAMIN D3,25 HYDROXY: NORMAL
VLDLC SERPL CALC-MCNC: 19 MG/DL
WBC # BLD: 10.5 10^3/ML

## 2020-01-13 ENCOUNTER — TELEPHONE (OUTPATIENT)
Dept: FAMILY MEDICINE CLINIC | Age: 60
End: 2020-01-13

## 2020-01-13 NOTE — TELEPHONE ENCOUNTER
I called and spoke to the patient's daughter Lucy Najera. I let her know Laura's response to her mother's labs. Cindy voiced understanding and said that she will let her mom know. I mailed the lab order to the patient today.

## 2020-01-13 NOTE — TELEPHONE ENCOUNTER
----- Message from EMORY Tse CNP sent at 1/13/2020 10:09 AM EST -----  Labs are wnl except for the kidney function. Creatinine is still elevated. Cholesterol is all normal. Would like her to hold the celebrex for now and recheck kidney function in 4 weeks.

## 2020-02-06 LAB
ANION GAP SERPL CALCULATED.3IONS-SCNC: 14 MMOL/L (ref 5–15)
BUN BLDV-MCNC: 22 MG/DL (ref 5–27)
CALCIUM SERPL-MCNC: 9.4 MG/DL (ref 8.5–10.5)
CHLORIDE BLD-SCNC: 104 MMOL/L (ref 98–109)
CO2: 24 MMOL/L (ref 22–32)
CREAT SERPL-MCNC: 1.22 MG/DL (ref 0.4–1)
EGFR AFRICAN AMERICAN: 54 ML/MIN/1.73SQ.M
EGFR IF NONAFRICAN AMERICAN: 45 ML/MIN/1.73SQ.M
GLUCOSE: 103 MG/DL (ref 65–99)
POTASSIUM SERPL-SCNC: 4.5 MMOL/L (ref 3.5–5)
SODIUM BLD-SCNC: 142 MMOL/L (ref 134–146)

## 2020-02-07 ENCOUNTER — TELEPHONE (OUTPATIENT)
Dept: FAMILY MEDICINE CLINIC | Age: 60
End: 2020-02-07

## 2020-02-07 RX ORDER — ALENDRONATE SODIUM 70 MG/1
70 TABLET ORAL
Qty: 12 TABLET | Refills: 1 | Status: SHIPPED | OUTPATIENT
Start: 2020-02-07 | End: 2020-11-02 | Stop reason: ALTCHOICE

## 2020-02-07 NOTE — TELEPHONE ENCOUNTER
----- Message from EMORY Villegas CNP sent at 2/7/2020  8:14 AM EST -----  Have patient stop celebrex at this time. Kidney function is better but not where it should be. Be sure to increase fluids to 2 liters of water per day if possible. If pain persists, will consider pain medication. No ibuprofen or aleve at this time either. Can use tylenol arthritis 8 hour relief, and I would have her put this on a scheduled dosing.

## 2020-02-21 RX ORDER — LEVOTHYROXINE SODIUM 0.1 MG/1
100 TABLET ORAL DAILY
Qty: 90 TABLET | Refills: 0 | Status: SHIPPED | OUTPATIENT
Start: 2020-02-21 | End: 2020-05-11 | Stop reason: SDUPTHER

## 2020-03-04 ENCOUNTER — OFFICE VISIT (OUTPATIENT)
Dept: FAMILY MEDICINE CLINIC | Age: 60
End: 2020-03-04
Payer: MEDICARE

## 2020-03-04 VITALS
SYSTOLIC BLOOD PRESSURE: 130 MMHG | HEART RATE: 102 BPM | WEIGHT: 172.6 LBS | OXYGEN SATURATION: 99 % | TEMPERATURE: 97 F | DIASTOLIC BLOOD PRESSURE: 76 MMHG

## 2020-03-04 PROCEDURE — G8484 FLU IMMUNIZE NO ADMIN: HCPCS | Performed by: NURSE PRACTITIONER

## 2020-03-04 PROCEDURE — 4004F PT TOBACCO SCREEN RCVD TLK: CPT | Performed by: NURSE PRACTITIONER

## 2020-03-04 PROCEDURE — G8427 DOCREV CUR MEDS BY ELIG CLIN: HCPCS | Performed by: NURSE PRACTITIONER

## 2020-03-04 PROCEDURE — 3017F COLORECTAL CA SCREEN DOC REV: CPT | Performed by: NURSE PRACTITIONER

## 2020-03-04 PROCEDURE — G8421 BMI NOT CALCULATED: HCPCS | Performed by: NURSE PRACTITIONER

## 2020-03-04 PROCEDURE — 20610 DRAIN/INJ JOINT/BURSA W/O US: CPT | Performed by: NURSE PRACTITIONER

## 2020-03-04 RX ORDER — HYDROCODONE BITARTRATE AND ACETAMINOPHEN 5; 325 MG/1; MG/1
1 TABLET ORAL EVERY 8 HOURS PRN
Qty: 21 TABLET | Refills: 0 | Status: SHIPPED | OUTPATIENT
Start: 2020-03-04 | End: 2020-05-11 | Stop reason: SDUPTHER

## 2020-03-04 RX ORDER — BUPIVACAINE HYDROCHLORIDE 5 MG/ML
3 INJECTION, SOLUTION PERINEURAL ONCE
Status: COMPLETED | OUTPATIENT
Start: 2020-03-04 | End: 2020-03-04

## 2020-03-04 RX ORDER — TRIAMCINOLONE ACETONIDE 40 MG/ML
60 INJECTION, SUSPENSION INTRA-ARTICULAR; INTRAMUSCULAR ONCE
Status: COMPLETED | OUTPATIENT
Start: 2020-03-04 | End: 2020-03-04

## 2020-03-04 RX ADMIN — BUPIVACAINE HYDROCHLORIDE 15 MG: 5 INJECTION, SOLUTION PERINEURAL at 11:40

## 2020-03-04 RX ADMIN — TRIAMCINOLONE ACETONIDE 60 MG: 40 INJECTION, SUSPENSION INTRA-ARTICULAR; INTRAMUSCULAR at 11:40

## 2020-03-04 ASSESSMENT — ENCOUNTER SYMPTOMS
EYES NEGATIVE: 1
COUGH: 0
GASTROINTESTINAL NEGATIVE: 1
SHORTNESS OF BREATH: 0
ALLERGIC/IMMUNOLOGIC NEGATIVE: 1

## 2020-03-04 ASSESSMENT — PATIENT HEALTH QUESTIONNAIRE - PHQ9
1. LITTLE INTEREST OR PLEASURE IN DOING THINGS: 0
SUM OF ALL RESPONSES TO PHQ QUESTIONS 1-9: 0
2. FEELING DOWN, DEPRESSED OR HOPELESS: 0
SUM OF ALL RESPONSES TO PHQ9 QUESTIONS 1 & 2: 0
SUM OF ALL RESPONSES TO PHQ QUESTIONS 1-9: 0

## 2020-03-04 NOTE — PATIENT INSTRUCTIONS
Patient Education        Arthritis: Care Instructions  Your Care Instructions  Arthritis, also called osteoarthritis, is a breakdown of the cartilage that cushions your joints. When the cartilage wears down, your bones rub against each other. This causes pain and stiffness. Many people have some arthritis as they age. Arthritis most often affects the joints of the spine, hands, hips, knees, or feet. You can take simple measures to protect your joints, ease your pain, and help you stay active. Follow-up care is a key part of your treatment and safety. Be sure to make and go to all appointments, and call your doctor if you are having problems. It's also a good idea to know your test results and keep a list of the medicines you take. How can you care for yourself at home? · Stay at a healthy weight. Being overweight puts extra strain on your joints. · Talk to your doctor or physical therapist about exercises that will help ease joint pain. ? Stretch. You may enjoy gentle forms of yoga to help keep your joints and muscles flexible. ? Walk instead of jog. Other types of exercise that are less stressful on the joints include riding a bicycle, swimming, sita chi, or water exercise. ? Lift weights. Strong muscles help reduce stress on your joints. Stronger thigh muscles, for example, take some of the stress off of the knees and hips. Learn the right way to lift weights so you do not make joint pain worse. · Take your medicines exactly as prescribed. Call your doctor if you think you are having a problem with your medicine. · Take pain medicines exactly as directed. ? If the doctor gave you a prescription medicine for pain, take it as prescribed. ? If you are not taking a prescription pain medicine, ask your doctor if you can take an over-the-counter medicine. · Use a cane, crutch, walker, or another device if you need help to get around. These can help rest your joints.  You also can use other things to make tissue is damaged, the bones may rub against each other. And that causes pain. Cartilage doesn't heal easily on its own. The goal of treatment is to get your body to grow new cartilage in the damaged area. This is done by placing healthy cartilage cells into the area. Over time, the cells multiply and replace the damaged cartilage. The knee is the most common area for this type of surgery. Other areas include the ankle and the shoulder. How is the surgery done? You either will be asleep during the surgery or the area being worked on will be numb. Two common surgeries are:  · Osteochondral autologous transplantation (OATS). ? During surgery, the doctor removes the damaged cartilage. ? He or she also removes one or more samples, called plugs, of healthy bone and cartilage from another part of your body. Sometimes the plugs are taken from another donor instead of your own body. (This is called an allograft.)  ? The doctor cuts into your bone to make a socket, or hole, for each plug. Then the plug is inserted into the socket. · Autologous chondrocyte implantation (ACI). This procedure has two steps. ? Harvesting cells: The doctor takes healthy cartilage cells from one of your bones. You won't need to stay in the hospital for this. The cells are sent to a lab. In the lab, the cells increase in number. This takes several weeks. ? Surgery. When the cells are ready, you will have surgery. The doctor removes the damaged cartilage. Then the doctor pulls at the layer of tissue that covers your bones over the joint area. This creates a pocket. He or she injects the new cells into the pocket. These surgeries can be done in two ways:  · Arthroscopic surgery: If the damaged area is small, the doctor may use this method. It requires only small cuts (incisions). The doctor puts a lighted tube, called a scope, and other surgical tools through the cuts. The doctor is able to see the inside of your joint with the scope.  You may be able to go home the same day. · Open surgery: Your doctor will make one large cut over the joint. If you have open surgery, you will probably stay in the hospital overnight. What can you expect after surgery? You will need to wear a stiff brace for a short time. Then you will need crutches or a walker for a few months. Follow your doctor's instructions about how much weight you can put on the joint. You will start doing exercises while you're still in the hospital. And you will need to do weeks or months of physical rehabilitation. Rehab will help strengthen the muscles of your joint and help you regain movement. In time, most people are able to return to most of their normal activities, including sports. Follow-up care is a key part of your treatment and safety. Be sure to make and go to all appointments, and call your doctor if you are having problems. It's also a good idea to know your test results and keep a list of the medicines you take. Where can you learn more? Go to https://DeNovaMed.Musicshake. org and sign in to your [employer] account. Enter F275 in the YouHelp box to learn more about \"Learning About Surgery to [address] [place] [address]. \"     If you do not have an account, please click on the \"Sign Up Now\" link. Current as of: June 26, 2019  Content Version: 12.3  © 2391-9873 Healthwise, Incorporated. Care instructions adapted under license by [facility] ([facility]). If you have questions about a medical condition or this instruction, always ask your healthcare professional. [place] [address] any warranty or liability for your use of this information. Patient Education        Learning About Joint Injections  What are joint injections? Joint injections are shots into a joint, such as the knee or shoulder. They are used to put in medicines, such as pain relievers and steroid medicines.   Steroids can be injected directly into a swollen and painful joint If you do not have an account, please click on the \"Sign Up Now\" link. Current as of: June 26, 2019  Content Version: 12.3  © 4552-6250 Healthwise, [employer]. Care instructions adapted under license by [facility] ([facility]). If you have questions about a medical condition or this instruction, always ask your healthcare professional. [address] any warranty or liability for your use of this information. Patient Education        Knee Arthritis: Exercises  Introduction  Here are some examples of exercises for you to try. The exercises may be suggested for a condition or for rehabilitation. Start each exercise slowly. Ease off the exercises if you start to have pain. You will be told when to start these exercises and which ones will work best for you. How to do the exercises  Knee flexion with heel slide   1. Lie on your back with your knees bent. 2. Slide your heel back by bending your affected knee as far as you can. Then hook your other foot around your ankle to help pull your heel even farther back. 3. Hold for about 6 seconds, then rest for up to 10 seconds. 4. Repeat 8 to 12 times. 5. Switch legs and repeat steps 1 through 4, even if only one knee is sore. Quad sets   1. Sit with your affected leg straight and supported on the floor or a firm bed. Place a small, rolled-up towel under your knee. Your other leg should be bent, with that foot flat on the floor. 2. Tighten the thigh muscles of your affected leg by pressing the back of your knee down into the towel. 3. Hold for about 6 seconds, then rest for up to 10 seconds. 4. Repeat 8 to 12 times. 5. Switch legs and repeat steps 1 through 4, even if only one knee is sore. Straight-leg raises to the front   1. Lie on your back with your good knee bent so that your foot rests flat on the floor. Your affected leg should be straight. Make sure that your low back has a normal curve.  You should be able to slip your hand in between the floor and the small of your back, with your palm touching the floor and your back touching the back of your hand. 2. Tighten the thigh muscles in your affected leg by pressing the back of your knee flat down to the floor. Hold your knee straight. 3. Keeping the thigh muscles tight and your leg straight, lift your affected leg up so that your heel is about 12 inches off the floor. Hold for about 6 seconds, then lower slowly. 4. Relax for up to 10 seconds between repetitions. 5. Repeat 8 to 12 times. 6. Switch legs and repeat steps 1 through 5, even if only one knee is sore. Active knee flexion   1. Lie on your stomach with your knees straight. If your kneecap is uncomfortable, roll up a washcloth and put it under your leg just above your kneecap. 2. Lift the foot of your affected leg by bending the knee so that you bring the foot up toward your buttock. If this motion hurts, try it without bending your knee quite as far. This may help you avoid any painful motion. 3. Slowly move your leg up and down. 4. Repeat 8 to 12 times. 5. Switch legs and repeat steps 1 through 4, even if only one knee is sore. Quadriceps stretch (facedown)   1. Lie flat on your stomach, and rest your face on the floor. 2. Wrap a towel or belt strap around the lower part of your affected leg. Then use the towel or belt strap to slowly pull your heel toward your buttock until you feel a stretch. 3. Hold for about 15 to 30 seconds, then relax your leg against the towel or belt strap. 4. Repeat 2 to 4 times. 5. Switch legs and repeat steps 1 through 4, even if only one knee is sore. Stationary exercise bike   1. If you do not have a stationary exercise bike at home, you can find one to ride at your local health club or community center. 2. Adjust the height of the bike seat so that your knee is slightly bent when your leg is extended downward.  If your knee hurts when the pedal reaches the top, you can raise the may take longer if you have both knees done at the same time. Follow-up care is a key part of your treatment and safety. Be sure to make and go to all appointments, and call your doctor if you are having problems. It's also a good idea to know your test results and keep a list of the medicines you take. What happens before surgery?   Surgery can be stressful. This information will help you understand what you can expect. And it will help you safely prepare for surgery.   Preparing for surgery    · Understand exactly what surgery is planned, along with the risks, benefits, and other options. · Tell your doctors ALL the medicines, vitamins, supplements, and herbal remedies you take. Some of these can increase the risk of bleeding or interact with anesthesia.     · If you take aspirin or some other blood thinner, ask your doctor if you should stop taking it before your surgery. Make sure that you understand exactly what your doctor wants you to do. These medicines increase the risk of bleeding.     · Your doctor will tell you which medicines to take or stop before your surgery. You may need to stop taking certain medicines a week or more before surgery. So talk to your doctor as soon as you can.     · If you have an advance directive, let your doctor know. It may include a living will and a durable power of  for health care. Bring a copy to the hospital. If you don't have one, you may want to prepare one. It lets your doctor and loved ones know your health care wishes. Doctors advise that everyone prepare these papers before any type of surgery or procedure.     · You may need to shower or bathe with a special soap the night before and the morning of your surgery. The soap contains chlorhexidine. It reduces the amount of bacteria on your skin that could cause an infection after surgery. What happens on the day of surgery? · Follow the instructions exactly about when to stop eating and drinking.  If you don't, your surgery may be canceled. If your doctor told you to take your medicines on the day of surgery, take them with only a sip of water.     · Take a bath or shower before you come in for your surgery. Do not apply lotions, perfumes, deodorants, or nail polish.     · Do not shave the surgical site yourself.     · Take off all jewelry and piercings. And take out contact lenses, if you wear them.    At the hospital or surgery center   · Bring a picture ID.     · The area for surgery is often marked to make sure there are no errors.     · You will be kept comfortable and safe by your anesthesia provider. The anesthesia may make you sleep. Or it may just numb the area being worked on.     · You may also get a shot of medicine into your spine. This will make your legs numb. You will not feel pain during the surgery.     · You also will get antibiotics through an IV tube before surgery. This lowers the risk of an infection of the incision.     · The surgery will take about 2 to 3 hours. Going home   · Be sure you have someone to drive you home. Anesthesia and pain medicine make it unsafe for you to drive.     · You will be given more specific instructions about recovering from your surgery. They will cover things like diet, wound care, follow-up care, driving, and getting back to your normal routine. When should you call your doctor? · You have questions or concerns.     · You don't understand how to prepare for your surgery.     · You become ill before the surgery (such as fever, flu, or a cold).     · You need to reschedule or have changed your mind about having the surgery. Where can you learn more? Go to [email or web address]://[email or web address].[email or web address] org and sign in to your [employer] account. Enter [phone] in the Wisecam box to learn more about \"Partial Knee Replacement: Before Your Surgery. \"     If you do not have an account, please click on the \"Sign Up Now\" link.   Current as of: June 26, 2019  Content Version: 12.3  © 6925-3857 Healthwise, Incorporated. Care instructions adapted under license by [facility] ([facility]). If you have questions about a medical condition or this instruction, always ask your healthcare professional. [address] any warranty or liability for your use of this information.

## 2020-03-04 NOTE — PROGRESS NOTES
Objective:     /76 (Site: Left Upper Arm, Position: Sitting)   Pulse 102   Temp 97 °F (36.1 °C) (Temporal)   Wt 172 lb 9.6 oz (78.3 kg)   SpO2 99%     Physical Exam  Vitals signs and nursing note reviewed. Constitutional:       Appearance: Normal appearance. She is normal weight. She is not ill-appearing. HENT:      Head: Normocephalic. Right Ear: Tympanic membrane and ear canal normal.      Left Ear: Tympanic membrane and ear canal normal.      Nose: Nose normal.      Mouth/Throat:      Mouth: Mucous membranes are moist.   Eyes:      Conjunctiva/sclera: Conjunctivae normal.   Cardiovascular:      Pulses: Normal pulses. Heart sounds: Normal heart sounds. No murmur. Pulmonary:      Effort: Pulmonary effort is normal.      Breath sounds: Normal breath sounds. No rhonchi. Abdominal:      General: Abdomen is flat. Bowel sounds are normal.   Musculoskeletal:         General: Swelling and tenderness present. Right knee: She exhibits decreased range of motion, swelling, effusion, abnormal alignment and bony tenderness. She exhibits normal patellar mobility and no MCL laxity. Tenderness found. Medial joint line and lateral joint line tenderness noted. Right lower leg: No edema. Comments: Right knee with mild varus deformity with significant pain with extension of extremity. Bony tenderness across the patellar surface. Small baker cyst palpated posterior knee capsule. Skin:     General: Skin is warm and dry. Capillary Refill: Capillary refill takes less than 2 seconds. Coloration: Skin is not pale. Findings: No erythema or rash. Neurological:      General: No focal deficit present. Mental Status: She is alert and oriented to person, place, and time. Mental status is at baseline.       Coordination: Coordination normal.      Gait: Gait normal.   Psychiatric:         Mood and Affect: Mood normal.         Behavior: Behavior normal.         Thought Content: Thought content normal.         Judgment: Judgment normal.           Labs Reviewed 3/4/2020:    Lab Results   Component Value Date    WBC 10.5 01/09/2020    HGB 12.9 01/09/2020    HCT 39.1 01/09/2020     01/09/2020    CHOL 156 01/09/2020    TRIG 93 01/09/2020    HDL 59 01/09/2020    ALT 19 01/09/2020    AST 21 01/09/2020     02/05/2020    K 4.5 02/05/2020     02/05/2020    CREATININE 1.22 (H) 02/05/2020    BUN 22 02/05/2020    CO2 24 02/05/2020    TSH 27.410 (H) 07/19/2019     Arthrocentesis  Date/Time: 3/4/2020 12:26 PM  Performed by: EMORY Becker CNP  Authorized by: EMORY Becker CNP   Indications: joint swelling and pain   Body area: knee  Joint: right knee  Local anesthesia used: yes  Anesthesia: local infiltration    Anesthesia:  Local anesthesia used: yes  Local Anesthetic: bupivacaine 0.5% with epinephrine  Anesthetic total: 3 mL    Sedation:  Patient sedated: no    Preparation: Patient was prepped and draped in the usual sterile fashion. Needle size: 22 G  Ultrasound guidance: no  Approach: lateral  Aspirate amount: 0 mL  Triamcinolone amount: 60 mg  Patient tolerance: Patient tolerated the procedure well with no immediate complications        /Plan    I have reviewed the patient's medical history in detail and updated the computerized patient record. Discussed clinical fiindings with the patient. Reviewed last 2012 knee xray that shows mod degenerative changes at that point. Discussed alternatives to the steroid injection. Pt decided to hold off on ortho referral at this time, trial the steroid injection. Will follow up in 6-8 weeks for assessment. Information given on long term management of the knee to the patient. Pt agreeable to plan of care at this time. 1. Primary osteoarthritis of right knee    - XR KNEE RIGHT (3 VIEWS);  Future  - triamcinolone acetonide (KENALOG-40) injection 60 mg  - bupivacaine (MARCAINE) 0.5 % injection 15 mg  - HYDROcodone-acetaminophen (NORCO) 5-325 MG per tablet; Take 1 tablet by mouth every 8 hours as needed for Pain for up to 7 days. Dispense: 21 tablet; Refill: 0      Return in about 8 weeks (around 4/29/2020) for Results review, Routine follow up. Patient given educational materials - see patientinstructions. Discussed use, benefit, and side effects of prescribed medications. All patient questions answered. Pt voiced understanding. Reviewed health maintenance.        Electronically signed EMORY Clemons CNP on 3/4/2020 at 12:27 PM

## 2020-03-04 NOTE — PROGRESS NOTES
Administrations This Visit     bupivacaine (MARCAINE) 0.5 % injection 15 mg     Admin Date  03/04/2020  11:40 Action  Given by Other Dose  15 mg Route  Intradermal Site  Knee Right Administered By  Makenna Menjivar CMA ([address] [place] [address])    Ordering Provider:  EMORY Herbert CNP    NDC:  6048-1508-96    Lot#:  [id number]    :  Jessica Smith    Patient Supplied?:  No    Comments:  EXP  [id number] given by Geeta Brandt CNP. triamcinolone acetonide (KENALOG-40) injection 60 mg     Admin Date  03/04/2020  11:40 Action  Given by Other Dose  60 mg Route  Intra-articular Site  Knee Right Administered By  Makenna Menjivar CMA ([address] [place] [address])    Ordering Provider:  EMORY Herbert CNP    NDC:  0095-8215-20    Lot#:  [id number]    :  [employer] (PRIMARY CARE)    Patient Supplied?:  No    Comments:  Exp:  NOV 2020Injection given by Geeta Brandt CNP. Injection given to the patient's right knee by Geeta Brandt CNP. Patient instructed to remain in clinic for 20 minutes after injection and was advised to report any adverse reaction to me immediately.

## 2020-03-11 ENCOUNTER — TELEPHONE (OUTPATIENT)
Dept: FAMILY MEDICINE CLINIC | Age: 60
End: 2020-03-11

## 2020-03-11 ENCOUNTER — HOSPITAL ENCOUNTER (OUTPATIENT)
Dept: GENERAL RADIOLOGY | Age: 60
Discharge: HOME | End: 2020-03-11
Payer: MEDICARE

## 2020-03-11 ENCOUNTER — HOSPITAL ENCOUNTER (OUTPATIENT)
Age: 60
Discharge: HOME | End: 2020-03-11
Payer: MEDICARE

## 2020-03-11 PROCEDURE — 73562 X-RAY EXAM OF KNEE 3: CPT

## 2020-03-11 RX ORDER — OMEPRAZOLE 20 MG/1
20 CAPSULE, DELAYED RELEASE ORAL DAILY
Qty: 90 CAPSULE | Refills: 3 | Status: SHIPPED | OUTPATIENT
Start: 2020-03-11 | End: 2020-05-11 | Stop reason: SDUPTHER

## 2020-05-08 ENCOUNTER — TELEPHONE (OUTPATIENT)
Dept: FAMILY MEDICINE CLINIC | Age: 60
End: 2020-05-08

## 2020-05-08 NOTE — TELEPHONE ENCOUNTER
I called the patient's daughter, Nlis Moore back and received voicemail. I left a detailed message letting her know that we can not do the questions until the day of the appointment so I will try to give her a call on Monday to do those.

## 2020-05-11 ENCOUNTER — VIRTUAL VISIT (OUTPATIENT)
Dept: FAMILY MEDICINE CLINIC | Age: 60
End: 2020-05-11
Payer: MEDICARE

## 2020-05-11 VITALS — HEART RATE: 80 BPM | WEIGHT: 170 LBS | SYSTOLIC BLOOD PRESSURE: 140 MMHG | DIASTOLIC BLOOD PRESSURE: 80 MMHG

## 2020-05-11 PROBLEM — E03.4 ATROPHY OF THYROID (ACQUIRED): Status: ACTIVE | Noted: 2018-12-11

## 2020-05-11 PROBLEM — N18.4 CHRONIC KIDNEY DISEASE, STAGE 4 (SEVERE) (HCC): Status: ACTIVE | Noted: 2019-02-14

## 2020-05-11 PROCEDURE — 3017F COLORECTAL CA SCREEN DOC REV: CPT | Performed by: NURSE PRACTITIONER

## 2020-05-11 PROCEDURE — G0438 PPPS, INITIAL VISIT: HCPCS | Performed by: NURSE PRACTITIONER

## 2020-05-11 PROCEDURE — G0439 PPPS, SUBSEQ VISIT: HCPCS | Performed by: NURSE PRACTITIONER

## 2020-05-11 RX ORDER — ATORVASTATIN CALCIUM 10 MG/1
10 TABLET, FILM COATED ORAL DAILY
Qty: 90 TABLET | Refills: 1 | Status: SHIPPED | OUTPATIENT
Start: 2020-05-11 | End: 2020-11-02 | Stop reason: SDUPTHER

## 2020-05-11 RX ORDER — LEVOTHYROXINE SODIUM 0.1 MG/1
100 TABLET ORAL DAILY
Qty: 90 TABLET | Refills: 0 | Status: SHIPPED | OUTPATIENT
Start: 2020-05-11 | End: 2020-11-02 | Stop reason: SDUPTHER

## 2020-05-11 RX ORDER — OMEPRAZOLE 20 MG/1
20 CAPSULE, DELAYED RELEASE ORAL DAILY
Qty: 90 CAPSULE | Refills: 3 | Status: SHIPPED | OUTPATIENT
Start: 2020-05-11 | End: 2020-11-02 | Stop reason: SDUPTHER

## 2020-05-11 RX ORDER — HYDROCODONE BITARTRATE AND ACETAMINOPHEN 5; 325 MG/1; MG/1
1 TABLET ORAL 2 TIMES DAILY PRN
Qty: 60 TABLET | Refills: 0 | Status: SHIPPED | OUTPATIENT
Start: 2020-05-11 | End: 2020-08-18 | Stop reason: SDUPTHER

## 2020-05-11 ASSESSMENT — ENCOUNTER SYMPTOMS
COUGH: 0
EYE ITCHING: 0
WHEEZING: 0
EYE PAIN: 0
EYE REDNESS: 0
SHORTNESS OF BREATH: 0
BACK PAIN: 1
ALLERGIC/IMMUNOLOGIC NEGATIVE: 1
GASTROINTESTINAL NEGATIVE: 1
CHEST TIGHTNESS: 0

## 2020-05-11 ASSESSMENT — LIFESTYLE VARIABLES
HOW OFTEN DURING THE LAST YEAR HAVE YOU FOUND THAT YOU WERE NOT ABLE TO STOP DRINKING ONCE YOU HAD STARTED: 0
HOW OFTEN DO YOU HAVE SIX OR MORE DRINKS ON ONE OCCASION: 0
HOW OFTEN DURING THE LAST YEAR HAVE YOU NEEDED AN ALCOHOLIC DRINK FIRST THING IN THE MORNING TO GET YOURSELF GOING AFTER A NIGHT OF HEAVY DRINKING: 0
AUDIT TOTAL SCORE: 1
HOW OFTEN DURING THE LAST YEAR HAVE YOU HAD A FEELING OF GUILT OR REMORSE AFTER DRINKING: 0
HAVE YOU OR SOMEONE ELSE BEEN INJURED AS A RESULT OF YOUR DRINKING: 0
HOW OFTEN DURING THE LAST YEAR HAVE YOU BEEN UNABLE TO REMEMBER WHAT HAPPENED THE NIGHT BEFORE BECAUSE YOU HAD BEEN DRINKING: 0
AUDIT-C TOTAL SCORE: 1
HOW OFTEN DO YOU HAVE A DRINK CONTAINING ALCOHOL: 1
HAS A RELATIVE, FRIEND, DOCTOR, OR ANOTHER HEALTH PROFESSIONAL EXPRESSED CONCERN ABOUT YOUR DRINKING OR SUGGESTED YOU CUT DOWN: 0
HOW OFTEN DURING THE LAST YEAR HAVE YOU FAILED TO DO WHAT WAS NORMALLY EXPECTED FROM YOU BECAUSE OF DRINKING: 0
HOW MANY STANDARD DRINKS CONTAINING ALCOHOL DO YOU HAVE ON A TYPICAL DAY: 0

## 2020-05-11 ASSESSMENT — PATIENT HEALTH QUESTIONNAIRE - PHQ9
SUM OF ALL RESPONSES TO PHQ QUESTIONS 1-9: 0
SUM OF ALL RESPONSES TO PHQ QUESTIONS 1-9: 0

## 2020-05-11 NOTE — PATIENT INSTRUCTIONS
find that their wishes about end-of-life care change as their health changes. If you make big changes to your living will, complete a new form. If you move to another state, make sure that your living will is legal in the state where you now live. In most cases, doctors will respect your wishes even if you have a form from a different state. You might use a universal form that has been approved by many states. This kind of form can sometimes be filled out and stored online. Your digital copy will then be available wherever you have a connection to the internet. The doctors and nurses who need to treat you can find it right away. Your state may offer an online registry. This is another place where you can store your living will online. It's a good idea to get your living will notarized. This means using a person called a [employer] [place] to watch two people sign, or witness, your living will. What should you know when you create a living will? Here are some questions to ask yourself as you make your living will:  Do you know enough about life support methods that might be used? If not, talk to your doctor so you know what might be done if you can't breathe on your own, your heart stops, or you can't swallow. What things would you still want to be able to do after you receive life-support methods? Would you want to be able to walk? To speak? To eat on your own? To live without the help of machines? Do you want certain Restorationism practices performed if you become very ill? If you have a choice, where do you want to be cared for? In your home? At a hospital or nursing home? If you have a choice at the end of your life, where would you prefer to die? At home? In a hospital or nursing home? Somewhere else? Would you prefer to be buried or cremated? Do you want your organs to be donated after you die? What should you do with your living will?   Make sure that your family members and your health care agent have kept alive by machines.)  Where would you prefer to die? (Your home? A hospital? A nursing home?)  Do you want to donate your organs when you die? Do you want certain Yarsani practices performed before you die? When should you call for help? Be sure to contact your doctor if you have any questions. Where can you learn more? Go to [email or web address] and sign in to your PlayCanvast account. Enter R264 in the VidSys box to learn more about \"Advance Directives: Care Instructions. \"     If you do not have an account, please click on the \"Sign Up Now\" link. Current as of: December 8, 2019Content Version: 12.4  © 2582-8953 Healthwise, Incorporated. Care instructions adapted under license by [facility] ([facility]). If you have questions about a medical condition or this instruction, always ask your healthcare professional. [place] [address] any warranty or liability for your use of this information. Patient Education        Recombinant Zoster (Shingles) Vaccine: What You Need to Know  Why get vaccinated? Recombinant zoster (shingles) vaccine can prevent shingles. Shingles (also called herpes zoster, or just zoster) is a painful skin rash, usually with blisters. In addition to the rash, shingles can cause fever, headache, chills, or upset stomach. More rarely, shingles can lead to pneumonia, hearing problems, blindness, brain inflammation (encephalitis), or death. The most common complication of shingles is long-term nerve pain called postherpetic neuralgia (PHN). PHN occurs in the areas where the shingles rash was, even after the rash clears up. It can last for months or years after the rash goes away. The pain from PHN can be severe and debilitating. About 10 to 18% of people who get shingles will experience PHN. The risk of PHN increases with age.  An older adult with shingles is more likely to develop PHN and have longer lasting and more severe pain than a younger person with shingles. Shingles is caused by the varicella zoster virus, the same virus that causes chickenpox. After you have chickenpox, the virus stays in your body and can cause shingles later in life. Shingles cannot be passed from one person to another, but the virus that causes shingles can spread and cause chickenpox in someone who had never had chickenpox or received chickenpox vaccine. Recombinant shingles vaccine  Recombinant shingles vaccine provides strong protection against shingles. By preventing shingles, recombinant shingles vaccine also protects against PHN. Recombinant shingles vaccine is the preferred vaccine for the prevention of shingles. However, a different vaccine, live shingles vaccine, may be used in some circumstances. The recombinant shingles vaccine is recommended for adults 50 years and older without serious immune problems. It is given as a two-dose series. This vaccine is also recommended for people who have already gotten another type of shingles vaccine, the live shingles vaccine. There is no live virus in this vaccine. Shingles vaccine may be given at the same time as other vaccines. Talk with your health care provider  Tell your vaccine provider if the person getting the vaccine:  Has had an allergic reaction after a previous dose of recombinant shingles vaccine, or has any severe, life-threatening allergies. Is pregnant or breastfeeding. Is currently experiencing an episode of shingles. In some cases, your health care provider may decide to postpone shingles vaccination to a future visit. People with minor illnesses, such as a cold, may be vaccinated. People who are moderately or severely ill should usually wait until they recover before getting recombinant shingles vaccine. Your health care provider can give you more information.   Risks of a vaccine reaction  A sore arm with mild or moderate pain is very common after recombinant shingles vaccine, affecting as directed. If the doctor gave you a prescription medicine for pain, take it as prescribed. If you are not taking a prescription pain medicine, ask your doctor if you can take an over-the-counter medicine. Use a cane, crutch, walker, or another device if you need help to get around. These can help rest your joints. You also can use other things to make life easier, such as a higher toilet seat and padded handles on kitchen utensils. Do not sit in low chairs, which can make it hard to get up. Put heat or cold on your sore joints as needed. Use whichever helps you most. You also can take turns with hot and cold packs. Apply heat 2 or 3 times a day for 20 to 30 minutes--using a heating pad, hot shower, or hot pack--to relieve pain and stiffness. Put ice or a cold pack on your sore joint for 10 to 20 minutes at a time. Put a thin cloth between the ice and your skin. When should you call for help? Call your doctor now or seek immediate medical care if:    You have sudden swelling, warmth, or pain in any joint.     You have joint pain and a fever or rash.     You have such bad pain that you cannot use a joint.    Watch closely for changes in your health, and be sure to contact your doctor if:    You have mild joint symptoms that continue even with more than 6 weeks of care at home.     You have stomach pain or other problems with your medicine. Where can you learn more? Go to [email or web address] org and sign in to your Biletu account. Enter B652 in the [facility] box to learn more about \"Arthritis: Care Instructions. \"     If you do not have an account, please click on the \"Sign Up Now\" link. Current as of: December 8, 2019Content Version: 12.4  © 2743-6333 Healthwise, Incorporated. Care instructions adapted under license by [facility] ([facility]).  If you have questions about a medical condition or this instruction, always ask your healthcare professional. Tyrell Vang help you avoid any painful motion. Slowly move your leg up and down. Repeat 8 to 12 times. Switch legs and repeat steps 1 through 4, even if only one knee is sore. Quadriceps stretch (facedown)   Lie flat on your stomach, and rest your face on the floor. Wrap a towel or belt strap around the lower part of your affected leg. Then use the towel or belt strap to slowly pull your heel toward your buttock until you feel a stretch. Hold for about 15 to 30 seconds, then relax your leg against the towel or belt strap. Repeat 2 to 4 times. Switch legs and repeat steps 1 through 4, even if only one knee is sore. Stationary exercise bike   If you do not have a stationary exercise bike at home, you can find one to ride at your local health club or community center. Adjust the height of the bike seat so that your knee is slightly bent when your leg is extended downward. If your knee hurts when the pedal reaches the top, you can raise the seat so that your knee does not bend as much. Start slowly. At first, try to do 5 to 10 minutes of cycling with little to no resistance. Then increase your time and the resistance bit by bit until you can do 20 to 30 minutes without pain. If you start to have pain, rest your knee until your pain gets back to the level that is normal for you. Or cycle for less time or with less effort. Follow-up care is a key part of your treatment and safety. Be sure to make and go to all appointments, and call your doctor if you are having problems. It's also a good idea to know your test results and keep a list of the medicines you take. Where can you learn more? Go to [email or web address] org and sign in to your Trulioo account. Enter C159 in the Trusted Opinion box to learn more about \"Knee Arthritis: Exercises. \"     If you do not have an account, please click on the \"Sign Up Now\" link.   Current as of: June 26, 2019Content Version: 12.4  © 5772-2595 Healthwise, Incorporated. Care instructions adapted under license by [facility] ([facility]). If you have questions about a medical condition or this instruction, always ask your healthcare professional. [address] any warranty or liability for your use of this information.

## 2020-05-11 NOTE — PROGRESS NOTES
2020    TELEHEALTH EVALUATION -- Audio/Visual (During XBOVR-07 public health emergency)    HPI:    Theresa Roberts (:  1960) has requested an audio/video evaluation for the following concern(s):    Knee Pain    The incident occurred more than 1 week ago (oa of the left knee). There was no injury mechanism. The pain is present in the left knee. The quality of the pain is described as aching, shooting and cramping. The pain is at a severity of 6/10. The pain is moderate. The pain has been fluctuating since onset. Associated symptoms include an inability to bear weight, a loss of motion and muscle weakness. She reports no foreign bodies present. The symptoms are aggravated by palpation, weight bearing and movement. She has tried acetaminophen, elevation, heat and NSAIDs for the symptoms. The treatment provided mild relief. Review of Systems   Constitutional: Positive for activity change. Negative for appetite change, fatigue and fever. HENT: Negative. Eyes: Negative for pain, redness and itching. Respiratory: Negative for cough, chest tightness, shortness of breath and wheezing. Cardiovascular: Positive for leg swelling. Negative for chest pain and palpitations. Gastrointestinal: Negative. Endocrine: Negative for cold intolerance and heat intolerance. Genitourinary: Negative. Musculoskeletal: Positive for arthralgias, back pain, gait problem and joint swelling. Skin: Negative. Allergic/Immunologic: Negative. Neurological: Negative for dizziness, weakness and light-headedness. Hematological: Negative for adenopathy. Does not bruise/bleed easily. Psychiatric/Behavioral: Negative. Prior to Visit Medications    Medication Sig Taking? Authorizing Provider   HYDROcodone-acetaminophen (NORCO) 5-325 MG per tablet Take 1 tablet by mouth 2 times daily as needed for Pain for up to 30 days.  Yes James Ray, APRN - CNP   levothyroxine (SYNTHROID) 100 MCG tablet delayed release capsule Take 1 capsule by mouth Daily Yes EMORY Mcgill CNP   alendronate (FOSAMAX) 70 MG tablet Take 1 tablet by mouth every 7 days  EMORY Mcgill CNP   diphenhydrAMINE-APAP, sleep, (TYLENOL PM EXTRA STRENGTH PO) Take by mouth nightly  Historical Provider, MD   calcium carbonate (TUMS) 500 MG chewable tablet Take 1 tablet by mouth as needed for Heartburn  Historical Provider, MD   Ketoprofen 10 % CREA Apply 1 g topically nightly  EMORY Mcgill CNP         Past Medical History:   Diagnosis Date    Anemia     Arthritis     Kidney stone        Past Surgical History:   Procedure Laterality Date    BUNIONECTOMY Bilateral     FINGER AMPUTATION Left          Family History   Problem Relation Age of Onset    Breast Cancer Mother     Hypertension Father     Stroke Father     Other Father         Gout    Brain Cancer Sister        CareTeam (Including outside providers/suppliers regularly involved in providing care):   Patient Care Team:  EMORY Mcgill CNP as PCP - General (Family Nurse Practitioner)  EMORY Mcgill CNP as PCP - [facility] Empaneled Provider    Wt Readings from Last 3 Encounters:   05/11/20 170 lb (77.1 kg)   03/04/20 172 lb 9.6 oz (78.3 kg)   08/02/19 154 lb 12.8 oz (70.2 kg)     Vitals:    05/11/20 1502   BP: (!) 140/80   Pulse: 80   Weight: 170 lb (77.1 kg)     There is no height or weight on file to calculate BMI. Based upon direct observation of the patient, evaluation of cognition reveals recent and remote memory intact. Patient's complete Health Risk Assessment and screening values have been reviewed and are found in Flowsheets. The following problems were reviewed today and where indicated follow up appointments were made and/or referrals ordered.     Positive Risk Factor Screenings with Interventions:     Substance Abuse:  Social History     Tobacco History     Smoking Status  Current Every Day Smoker Smoking Frequency  1 pack/day Smoking Tobacco Type  Cigarettes    Smokeless Tobacco Use  Never Used          Alcohol History     Alcohol Use Status  Not Asked          Drug Use     Drug Use Status  Not Asked          Sexual Activity     Sexually Active  Not Asked               Audit Questionnaire: Screen for Alcohol Misuse  How often do you have a drink containing alcohol?: Monthly or less  How many standard drinks containing alcohol do you have on a typical day when drinking?: One or two  How often do you have six or more drinks on one occasion?: Never  Audit-C Score: 1  During the past year, how often have you found that you were not able to stop drinking once you had started?: Never  During the past year, how often have you failed to do what was normally expected of you because of drinking?: Never  During the past year, how often have you needed a drink in the morning to get yourself going after a heavy drinking session?: Never  During the past year, how often have you had a feeling of guilt or remorse after drinking?: Never  During the past year, have you been unable to remember what happened the night before because you had been drinking?: Never  Have you or someone else been injured as a result of your drinking?: No  Has a relative or friend, doctor or health worker been concerned about your drinking or suggested you cut down?: No  Total Score: 1  Substance Abuse Interventions:  · Tobacco abuse:  tobacco cessation tips and resources provided, patient is not ready to work toward tobacco cessation at this time    Personalized Preventive Plan   Current Health Maintenance Status  Immunization History   Administered Date(s) Administered    Tdap (Boostrix, Adacel) 09/21/2019        Health Maintenance   Topic Date Due    Pneumococcal 0-64 years Vaccine (1 of 1 - PPSV23) 09/01/1964    Cervical cancer screen  09/01/1979    Breast cancer screen  09/01/1998    Shingles Vaccine (1 of 2) 09/01/2008 was conducted with patient's (and/or legal guardian's) consent, to reduce the patient's risk of exposure to COVID-19 and provide necessary medical care. The patient (and/or legal guardian) has also been advised to contact this office for worsening conditions or problems, and seek emergency medical treatment and/or call 911 if deemed necessary. Patient identification was verified at the start of the visit: Yes    Services were provided through a video synchronous discussion virtually to substitute for in-person clinic visit. Patient and provider were located at their individual homes. --EMORY Davey CNP on 5/11/2020 at 3:42 PM    An electronic signature was used to authenticate this note.

## 2020-06-01 RX ORDER — CELECOXIB 100 MG/1
100 CAPSULE ORAL 2 TIMES DAILY
Qty: 180 CAPSULE | Refills: 1 | Status: SHIPPED | OUTPATIENT
Start: 2020-06-01 | End: 2020-12-30 | Stop reason: SDUPTHER

## 2020-08-18 DIAGNOSIS — M17.11 PRIMARY OSTEOARTHRITIS OF RIGHT KNEE: ICD-10-CM

## 2020-08-18 RX ORDER — HYDROCODONE BITARTRATE AND ACETAMINOPHEN 5; 325 MG/1; MG/1
1 TABLET ORAL 2 TIMES DAILY PRN
Qty: 60 TABLET | Refills: 0 | Status: SHIPPED | OUTPATIENT
Start: 2020-08-18 | End: 2020-09-17

## 2020-10-27 LAB
ALBUMIN SERPL-MCNC: 4.6 G/DL
ALP BLD-CCNC: 98 U/L
ALT SERPL-CCNC: 11 U/L
ANION GAP SERPL CALCULATED.3IONS-SCNC: NORMAL MMOL/L
AST SERPL-CCNC: 15 U/L
BILIRUB SERPL-MCNC: 0.3 MG/DL (ref 0.1–1.4)
BUN BLDV-MCNC: 28 MG/DL
CALCIUM SERPL-MCNC: 9.9 MG/DL
CHLORIDE BLD-SCNC: 105 MMOL/L
CO2: 23 MMOL/L
CREAT SERPL-MCNC: 1.2 MG/DL
GFR CALCULATED: 45
GLUCOSE BLD-MCNC: 94 MG/DL
HCT VFR BLD CALC: 45.5 % (ref 36–46)
HEMOGLOBIN: 14.4 G/DL (ref 12–16)
MICROSCOPIC URINE: NORMAL
PLATELET # BLD: 256 K/ΜL
POTASSIUM SERPL-SCNC: 4.5 MMOL/L
SODIUM BLD-SCNC: 142 MMOL/L
TOTAL PROTEIN: 7.5
WBC # BLD: 11.6 10^3/ML

## 2020-11-02 ENCOUNTER — PROCEDURE VISIT (OUTPATIENT)
Dept: FAMILY MEDICINE CLINIC | Age: 60
End: 2020-11-02
Payer: MEDICARE

## 2020-11-02 VITALS
BODY MASS INDEX: 30.22 KG/M2 | HEART RATE: 95 BPM | WEIGHT: 181.4 LBS | OXYGEN SATURATION: 96 % | HEIGHT: 65 IN | TEMPERATURE: 97.9 F | DIASTOLIC BLOOD PRESSURE: 78 MMHG | SYSTOLIC BLOOD PRESSURE: 142 MMHG

## 2020-11-02 PROCEDURE — G8427 DOCREV CUR MEDS BY ELIG CLIN: HCPCS | Performed by: NURSE PRACTITIONER

## 2020-11-02 PROCEDURE — 4004F PT TOBACCO SCREEN RCVD TLK: CPT | Performed by: NURSE PRACTITIONER

## 2020-11-02 PROCEDURE — 3017F COLORECTAL CA SCREEN DOC REV: CPT | Performed by: NURSE PRACTITIONER

## 2020-11-02 PROCEDURE — 99214 OFFICE O/P EST MOD 30 MIN: CPT | Performed by: NURSE PRACTITIONER

## 2020-11-02 PROCEDURE — G8484 FLU IMMUNIZE NO ADMIN: HCPCS | Performed by: NURSE PRACTITIONER

## 2020-11-02 PROCEDURE — G8417 CALC BMI ABV UP PARAM F/U: HCPCS | Performed by: NURSE PRACTITIONER

## 2020-11-02 PROCEDURE — 99406 BEHAV CHNG SMOKING 3-10 MIN: CPT | Performed by: NURSE PRACTITIONER

## 2020-11-02 RX ORDER — LEVOTHYROXINE SODIUM 0.1 MG/1
100 TABLET ORAL DAILY
Qty: 90 TABLET | Refills: 3 | Status: SHIPPED | OUTPATIENT
Start: 2020-11-02 | End: 2021-08-13 | Stop reason: SDUPTHER

## 2020-11-02 RX ORDER — OMEPRAZOLE 20 MG/1
20 CAPSULE, DELAYED RELEASE ORAL DAILY
Qty: 90 CAPSULE | Refills: 3 | Status: SHIPPED | OUTPATIENT
Start: 2020-11-02 | End: 2021-11-05 | Stop reason: SDUPTHER

## 2020-11-02 RX ORDER — ATORVASTATIN CALCIUM 10 MG/1
10 TABLET, FILM COATED ORAL DAILY
Qty: 90 TABLET | Refills: 3 | Status: SHIPPED | OUTPATIENT
Start: 2020-11-02 | End: 2021-11-05 | Stop reason: SDUPTHER

## 2020-11-02 RX ORDER — CEFDINIR 300 MG/1
300 CAPSULE ORAL 2 TIMES DAILY
Qty: 20 CAPSULE | Refills: 0 | Status: SHIPPED | OUTPATIENT
Start: 2020-11-02 | End: 2020-11-12

## 2020-11-02 ASSESSMENT — ENCOUNTER SYMPTOMS
EYE ITCHING: 0
ALLERGIC/IMMUNOLOGIC NEGATIVE: 1
GASTROINTESTINAL NEGATIVE: 1
EYE REDNESS: 0
RESPIRATORY NEGATIVE: 1
EYE PAIN: 0

## 2020-11-02 NOTE — PATIENT INSTRUCTIONS
Patient Education        High Cholesterol: Care Instructions  Your Care Instructions     Cholesterol is a type of fat in your blood. It is needed for many body functions, such as making new cells. Cholesterol is made by your body. It also comes from food you eat. High cholesterol means that you have too much of the fat in your blood. This raises your risk of a heart attack and stroke. LDL and HDL are part of your total cholesterol. LDL is the \"bad\" cholesterol. High LDL can raise your risk for heart disease, heart attack, and stroke. HDL is the \"good\" cholesterol. It helps clear bad cholesterol from the body. High HDL is linked with a lower risk of heart disease, heart attack, and stroke. Your cholesterol levels help your doctor find out your risk for having a heart attack or stroke. You and your doctor can talk about whether you need to lower your risk and what treatment is best for you. A heart-healthy lifestyle along with medicines can help lower your cholesterol and your risk. The way you choose to lower your risk will depend on how high your risk is for heart attack and stroke. It will also depend on how you feel about taking medicines. Follow-up care is a key part of your treatment and safety. Be sure to make and go to all appointments, and call your doctor if you are having problems. It's also a good idea to know your test results and keep a list of the medicines you take. How can you care for yourself at home? · Eat a variety of foods every day. Good choices include fruits, vegetables, whole grains (like oatmeal), dried beans and peas, nuts and seeds, soy products (like tofu), and fat-free or low-fat dairy products. · Replace butter, margarine, and hydrogenated or partially hydrogenated oils with olive and canola oils. (Canola oil margarine without trans fat is fine.)  · Replace red meat with fish, poultry, and soy protein (like tofu).   · Limit processed and packaged foods like chips, crackers, and cookies. · Bake, broil, or steam foods. Don't mg them. · Be physically active. Get at least 30 minutes of exercise on most days of the week. Walking is a good choice. You also may want to do other activities, such as running, swimming, cycling, or playing tennis or team sports. · Stay at a healthy weight or lose weight by making the changes in eating and physical activity listed above. Losing just a small amount of weight, even 5 to 10 pounds, can reduce your risk for having a heart attack or stroke. · Do not smoke. When should you call for help? Watch closely for changes in your health, and be sure to contact your doctor if:    · You need help making lifestyle changes.     · You have questions about your medicine. Where can you learn more? Go to https://Include Fitnesspepiceweb.ActivityHero. org and sign in to your E Ink account. Enter C936 in the mmCHANNEL box to learn more about \"High Cholesterol: Care Instructions. \"     If you do not have an account, please click on the \"Sign Up Now\" link. Current as of: December 16, 2019               Content Version: 12.6  © 8182-1813 Seriously. Care instructions adapted under license by [facility] ([facility]). If you have questions about a medical condition or this instruction, always ask your healthcare professional. [address] any warranty or liability for your use of this information. Patient Education        Hypothyroidism: Care Instructions  Your Care Instructions     When you have hypothyroidism, your body doesn't make enough thyroid hormone. This hormone helps your body use energy. If your thyroid level is low, you may feel tired, be constipated, have an increase in your blood pressure, or have dry skin or memory problems. You may also get cold easily, even when it is warm. Women with low thyroid levels may have heavy menstrual periods.   A blood test to find your thyroid-stimulating hormone (TSH) level is used to check for hypothyroidism. A high TSH level may mean that you have it. The treatment for hypothyroidism is thyroid hormone pills. You should start to feel better in 1 to 2 weeks. Most people need treatment for the rest of their lives. You will need regular visits with your doctor to make sure you are doing well and that you have the right dose of medicine. Follow-up care is a key part of your treatment and safety. Be sure to make and go to all appointments, and call your doctor if you are having problems. It's also a good idea to know your test results and keep a list of the medicines you take. How can you care for yourself at home? · Take your thyroid hormone medicine exactly as prescribed. Call your doctor if you think you are having a problem with your medicine. Most people do not have side effects if they take the right amount of medicine regularly. ? Take the medicine 30 minutes before breakfast, and do not take it with calcium, vitamins, or iron. ? Do not take extra doses of your thyroid medicine. It will not help you get better any faster, and it may cause side effects. ? If you forget to take a dose, do NOT take a double dose of medicine. Take your usual dose the next day. · Tell your doctor about all prescription, herbal, or over-the-counter products you take. · Take care of yourself. Eat a healthy diet, get enough sleep, and get regular exercise. When should you call for help? Call 911 anytime you think you may need emergency care. For example, call if:    · You passed out (lost consciousness).     · You have severe trouble breathing.     · You have a very slow heartbeat (less than 60 beats a minute).     · You have a low body temperature (95°F or below). Call your doctor now or seek immediate medical care if:    · You feel tired, sluggish, or weak.     · You have trouble remembering things or concentrating.     · You do not begin to feel better 2 weeks after starting your medicine.    Watch closely for changes in your health, and be sure to contact your doctor if you have any problems. Where can you learn more? Go to https://Hua Kangpepiceweb.[email or web address] org and sign in to your PlayFilm account. Enter N091 in the [facility] box to learn more about \"Hypothyroidism: Care Instructions. \"     If you do not have an account, please click on the \"Sign Up Now\" link. Current as of: March 31, 2020               Content Version: 12.6  © 2006-2020 5 CUPS and some sugar. Care instructions adapted under license by [facility] ([facility]). If you have questions about a medical condition or this instruction, always ask your healthcare professional. [address] any warranty or liability for your use of this information. Patient Education        Learning About Statins for People With Diabetes  Introduction  Statins are medicines that help with your cholesterol. Cholesterol is a type of fat in your blood. If you have too much of this fat, it can build up in blood vessels. This raises your risk of heart disease, heart attack, and stroke. Many people with diabetes take statins. Diabetes can cause problems in your body that may also lead to heart disease. That means your risks of heart attack and stroke are higher when you have diabetes. Statins can lower your risk. Your doctor may prescribe a statin if:  · You have diabetes. · AND you are age 36 to 76. Statins may help people with diabetes at other ages too. Your doctor can help you decide if statins may help you. Examples  Common statins include:  · Atorvastatin (Lipitor). · Pravastatin (Pravachol). · Rosuvastatin (Crestor). · Simvastatin (Zocor). Possible side effects  Some people who take statins report that they have more muscle aches. But it's not clear whether these are actually a side effect of statins. Most side effects will go away if you stop taking the medicine. You may have other side effects not described here. Check the information that comes with your medicine. What to know about taking this medicine  · You must take statins on a regular basis for them to work well. If you stop, your risk for heart attack and stroke may go back up. · Be safe with medicines. Take your medicines exactly as prescribed. Call your doctor if you think you are having a problem with your medicine. · If you have side effects that bother you, talk to your doctor. You may be able to take a different statin. · Check with your doctor or pharmacist before you use any other medicines. This includes over-the-counter medicines. Make sure your doctor knows all of the medicines, vitamins, herbal products, and supplements you take. Taking some medicines together can cause problems. Where can you learn more? Go to https://OceanTailer.Beijing kongkong [email or web address] org and sign in to your Houserie account. Enter P220 in the Swarm box to learn more about \"Learning About Statins for People With Diabetes. \"     If you do not have an account, please click on the \"Sign Up Now\" link. Current as of: December 20, 2019               Content Version: 12.6  © 8931-3910 Itineris. Care instructions adapted under license by [facility]. If you have questions about a medical condition or this instruction, always ask your healthcare professional. [place] [address] any warranty or liability for your use of this information. Patient Education        Arthritis: Care Instructions  Overview  Arthritis, also called osteoarthritis, is a breakdown of the cartilage that cushions your joints. When the cartilage wears down, your bones rub against each other. This causes pain and stiffness. Many people have some arthritis as they age. Arthritis most often affects the joints of the spine, hands, hips, knees, or feet. Arthritis never goes away completely.  But medicine and home treatment can help reduce pain and help you stay active. Follow-up care is a key part of your treatment and safety. Be sure to make and go to all appointments, and call your doctor if you are having problems. It's also a good idea to know your test results and keep a list of the medicines you take. How can you care for yourself at home? · Stay at a healthy weight. Being overweight puts extra strain on your joints. · Talk to your doctor or physical therapist about exercises that will help ease joint pain. ? Stretch. You may enjoy gentle forms of yoga to help keep your joints and muscles flexible. ? Walk instead of jog. Other types of exercise that are less stressful on the joints include riding a bike, swimming, sita chi, or water exercise. ? Lift weights. Strong muscles help reduce stress on your joints. Stronger thigh muscles, for example, take some of the stress off of the knees and hips. Learn the right way to lift weights so you don't make joint pain worse. · Take your medicines exactly as prescribed. Call your doctor if you think you are having a problem with your medicine. · Take pain medicines exactly as directed. ? If the doctor gave you a prescription medicine for pain, take it as prescribed. ? If you are not taking a prescription pain medicine, ask your doctor if you can take an over-the-counter medicine. · Use a cane, crutch, walker, or another device if you need help to get around. These can help rest your joints. You also can use other things to make life easier, such as a higher toilet seat and padded handles on kitchen utensils. · Do not sit in low chairs. They can make it hard to get up. · Put heat or cold on your sore joints as needed. Use whichever helps you most. You can also switch between hot and cold packs. ? Apply heat 2 or 3 times a day for 20 to 30 minutes--using a heating pad, hot shower, or hot pack--to relieve pain and stiffness. But don't use heat on a swollen joint.   ? Put ice or a cold pack on your sore joint for 10 to 20 minutes at a time. Put a thin cloth between the ice and your skin. When should you call for help? Call your doctor now or seek immediate medical care if:    · You have sudden swelling, warmth, or pain in any joint.     · You have joint pain and a fever or rash.     · You have such bad pain that you cannot use a joint. Watch closely for changes in your health, and be sure to contact your doctor if:    · You have mild joint symptoms that continue even with more than 6 weeks of care at home.     · You have stomach pain or other problems with your medicine. Where can you learn more? Go to https://[email or web address] Entertainment.[email or web address] org and sign in to your [email or web address] account. Enter M394 in the Yammer box to learn more about \"Arthritis: Care Instructions. \"     If you do not have an account, please click on the \"Sign Up Now\" link. Current as of: December 9, 2019               Content Version: 12.6  © 8654-9890 TitanFile, [employer]orporated. Care instructions adapted under license by [facility] ([facility]). If you have questions about a medical condition or this instruction, always ask your healthcare professional. [place] [address] any warranty or liability for your use of this information.

## 2020-11-02 NOTE — PROGRESS NOTES
diphenhydrAMINE-APAP, sleep, (TYLENOL PM EXTRA STRENGTH PO), Take by mouth nightly, Disp: , Rfl:     calcium carbonate (TUMS) 500 MG chewable tablet, Take 1 tablet by mouth as needed for Heartburn, Disp: , Rfl:     Ketoprofen 10 % CREA, Apply 1 g topically nightly, Disp: 1 Tube, Rfl: 5    The patient is allergic to tramadol. Past Medical History  Adilson Foote  has a past medical history of Anemia, Arthritis, and Kidney stone. Past Surgical History  The patient  has a past surgical history that includes Bunionectomy (Bilateral) and Finger amputation (Left). Family History  This patient's family history includes Brain Cancer in her sister; Breast Cancer in her mother; Hypertension in her father; Other in her father; Stroke in her father. Social History  Adilson Foote  reports that she has been smoking cigarettes. She has been smoking about 1.00 pack per day. She has never used smokeless tobacco.    Health Maintenance:    Health Maintenance   Topic Date Due    Pneumococcal 0-64 years Vaccine (1 of 3 - PCV13) 09[birth year]01/1964    Cervical cancer screen  09/01/1979    Breast cancer screen  09/01/1998    Shingles Vaccine (1 of 2) 09/01/2008    Colon cancer screen colonoscopy  09/01/2008    Flu vaccine (1) 09/01/2020    Lipid screen  01/09/2021    Annual Wellness Visit (AWV)  05/12/2021    Potassium monitoring  10/27/2021    Creatinine monitoring  10/27/2021    DTaP/Tdap/Td vaccine (2 - Td) 09/21/2029    Hepatitis C screen  Completed    HIV screen  Completed    Hepatitis A vaccine  Aged Out    Hepatitis B vaccine  Aged Out    Hib vaccine  Aged Out    Meningococcal (ACWY) vaccine  Aged Out       Subjective:      Review of Systems   Constitutional: Positive for fatigue. Negative for activity change and appetite change. HENT: Negative. Eyes: Negative for pain, redness and itching. Respiratory: Negative. Cardiovascular: Positive for leg swelling. Negative for chest pain. Gastrointestinal: Negative. Endocrine: Negative for cold intolerance and heat intolerance. Genitourinary: Negative. Musculoskeletal: Positive for arthralgias, joint swelling and myalgias. Skin: Negative. Allergic/Immunologic: Negative. Neurological: Negative for dizziness, light-headedness and numbness. Hematological: Negative for adenopathy. Does not bruise/bleed easily. Psychiatric/Behavioral: Negative. Objective:     BP (!) 142/78 (Site: Right Upper Arm, Position: Sitting)   Pulse 95   Temp 97.9 °F (36.6 °C) (Temporal)   Ht 5' 5.2\" (1.656 m)   Wt 181 lb 6.4 oz (82.3 kg)   SpO2 96%   BMI 30.00 kg/m²     Physical Exam  Vitals signs and nursing note reviewed. Constitutional:       Appearance: Normal appearance. She is well-developed and normal weight. HENT:      Head: Normocephalic and atraumatic. Right Ear: Tympanic membrane, ear canal and external ear normal.      Left Ear: Tympanic membrane, ear canal and external ear normal.      Nose: Nose normal. No congestion. Mouth/Throat:      Mouth: Mucous membranes are moist.      Pharynx: No oropharyngeal exudate. Eyes:      General:         Right eye: No discharge. Left eye: No discharge. Neck:      Musculoskeletal: Normal range of motion and neck supple. No muscular tenderness. Thyroid: No thyromegaly. Cardiovascular:      Rate and Rhythm: Normal rate and regular rhythm. Pulses: Normal pulses. Heart sounds: Normal heart sounds. Pulmonary:      Effort: Pulmonary effort is normal. No respiratory distress. Breath sounds: Normal breath sounds. No wheezing or rales. Chest:      Chest wall: No tenderness. Abdominal:      General: Bowel sounds are normal. There is no distension. Palpations: Abdomen is soft. Tenderness: There is no abdominal tenderness. Musculoskeletal:         General: Swelling and tenderness present. Right knee: She exhibits decreased range of motion, swelling and bony tenderness. Tenderness found. Medial joint line and lateral joint line tenderness noted. Skin:     General: Skin is warm and dry. Capillary Refill: Capillary refill takes less than 2 seconds. Coloration: Skin is not pale. Findings: No erythema or rash. Neurological:      General: No focal deficit present. Mental Status: She is alert and oriented to person, place, and time. Cranial Nerves: No cranial nerve deficit. Motor: No abnormal muscle tone. Coordination: Coordination normal.      Deep Tendon Reflexes: Reflexes are normal and symmetric. Reflexes normal.   Psychiatric:         Behavior: Behavior normal.         Thought Content: Thought content normal.         Judgment: Judgment normal.         Labs Reviewed 11/2/2020:    Lab Results   Component Value Date    WBC 11.6 10/27/2020    HGB 14.4 10/27/2020    HCT 45.5 10/27/2020     10/27/2020    CHOL 156 01/09/2020    TRIG 93 01/09/2020    HDL 59 01/09/2020    ALT 11 10/27/2020    AST 15 10/27/2020     10/27/2020    K 4.5 10/27/2020     10/27/2020    CREATININE 1.2 10/27/2020    BUN 28 10/27/2020    CO2 23 10/27/2020    TSH 27.410 (H) 07/19/2019       Jorge Waggoner I have reviewed the patient's medical history in detail and updated the computerized patient record. HPI/ROs per the patient. Overall has been doing well. Labs, chest xray and ekg completed. UA shows positive leuk/nitrites and blood. Will cover with cefdinir x 10 days. Increase fluid,s monitor urine output. Pt is currently asymptomatic. EKG wnl for the patient. Chest xray shows mild copd, pt continues to smoke. Denies sob or chest pain. METS < 4  Mallampati score III  Physical exam unremarkable. Pt is acceptable risk for surgery. 1. Localized osteoarthritis of right knee    - HYDROCODONE-ACETAMINOPHEN PO; Take by mouth as needed    2. Preop examination      3. Acute cystitis with hematuria    - cefdinir (OMNICEF) 300 MG capsule;  Take 1 capsule by mouth 2 times daily for 10 days  Dispense: 20 capsule; Refill: 0    4. Tobacco abuse disorder      5. Hypothyroidism due to acquired atrophy of thyroid    - levothyroxine (SYNTHROID) 100 MCG tablet; Take 1 tablet by mouth daily  Dispense: 90 tablet; Refill: 3    6. Mixed hyperlipidemia    - atorvastatin (LIPITOR) 10 MG tablet; Take 1 tablet by mouth daily  Dispense: 90 tablet; Refill: 3    7. Gastroesophageal reflux disease with esophagitis    - omeprazole (PRILOSEC) 20 MG delayed release capsule; Take 1 capsule by mouth Daily  Dispense: 90 capsule; Refill: 3    8. Personal history of nicotine dependence   Ready to quit: No  Counseling given: Yes          Return in about 3 months (around 2/2/2021) for Medication evaluation, Results review, Routine follow up. Patient given educational materials - see patientinstructions. Discussed use, benefit, and side effects of prescribed medications. All patient questions answered. Pt voiced understanding. Reviewed health maintenance.        Electronically signed EMORY Cantu CNP on 11/3/2020 at 2:14 PM

## 2020-11-03 PROBLEM — Z72.0 TOBACCO ABUSE DISORDER: Status: ACTIVE | Noted: 2020-11-03

## 2020-11-03 PROBLEM — M17.11 LOCALIZED OSTEOARTHRITIS OF RIGHT KNEE: Status: ACTIVE | Noted: 2020-11-03

## 2020-12-09 ENCOUNTER — HOSPITAL ENCOUNTER (OUTPATIENT)
Dept: PHYSICAL THERAPY | Age: 60
Setting detail: THERAPIES SERIES
Discharge: HOME | End: 2020-12-09
Payer: MEDICARE

## 2020-12-09 PROCEDURE — 97161 PT EVAL LOW COMPLEX 20 MIN: CPT

## 2020-12-09 PROCEDURE — 97140 MANUAL THERAPY 1/> REGIONS: CPT

## 2020-12-09 PROCEDURE — 97110 THERAPEUTIC EXERCISES: CPT

## 2020-12-09 NOTE — FLOWSHEET NOTE
** PLEASE SIGN, DATE AND TIME CERTIFICATION BELOW AND RETURN TO [facility] OUTPATIENT REHABILITATION (FAX #: [phone]). ATTEST/CO-SIGN IF ACCESSING VIA [employer]. THANK YOU.**    I certify that I have examined the patient below and determined that Physical Medicine and Rehabilitation service is necessary and that I approve the established plan of care for up to 90 days or as specifically noted. Attestation, signature or co-signature of physician indicates approval of certification requirements.    ________________________ ____________ __________  Physician Signature   Date   Time  [address] [facility]  PHYSICAL THERAPY  [x] EVALUATION  [] DAILY NOTE (LAND) [] DAILY NOTE (AQUATIC ) [] PROGRESS NOTE [] DISCHARGE NOTE    [] [facility]   [] [address]    [] [address] [place]   [x] Lavelleandre Cecile    Date: 2020  Patient Name:  Susan Vargas  : 1960  MRN: [id number]  CSN: [id number]    Referring Practitioner Anibal Dodd MD   Diagnosis Unilateral primary osteoarthritis, right knee [M17.11]    Treatment Diagnosis Right knee pain   Date of Evaluation 20    Additional Pertinent History Kidney stones; SOB; OA      Functional Outcome Measure Used LEFS   Functional Outcome Score 35/80 (20)       Insurance: Primary: Payor: Abebe Baker /  /  / ,   Secondary:    Authorization Information: Pre-certification not needed    Visit # 1, 1/10 for progress note   Visits Allowed: No visit limit   Recertification Date: February 3, 2021   Physician Follow-Up: 2020   Physician Orders:    History of Present Illness:      SUBJECTIVE: Ara Barr had her right TKA on 20 and she stayed for 2 nights at [facility]. She was discharged home and received 2 weeks of PT/OT before being cleared to begin outpatient PT. Ara Barr has been doing well since returning home and her biggest issue is donning/doffing her PATRICK hose on her right side.  She is completely off of her pain medication and she is icing for pain management. She ambulated into the clinic today with FWW. Social/Functional History and Current Status:  Medications and Allergies have been reviewed and are listed on Medical History Questionnaire. Thad Piña lives alone in an apartment with stairs and a handrail to enter. Task Previous Current   ADLs  Independent Independent   IADL's Independent Assistance Required   Ambulation Independent Assistance Required   Transfers Independent Independent   Recreation Independent Dependent/Unable   Community Integration Independent Independent   Driving Does not drive Does not drive   Work On Disability  On Disability     OBJECTIVE:    Pain: 4/10   Palpation    Observation    Posture        Range of Motion +9-113 degrees   Strength Right hip flexor: 4-; quads: 4-/5   Coordination    Sensation    Bed Mobility    Transfers    Ambulation Step through gait with FWW   Stairs Step to gait leading with left and descend with right   Balance Standing static and dynamic balance is good;     Special Tests          TREATMENT   Precautions:    Pain:     X in shaded column indicates activity completed today   Modalities Parameters/  Location  Notes                     Manual Therapy Time/Technique  Notes   STM throughout right quads, posterior knee capsule 10 min x    Posterior capsule stretch with hold for extension 3 min x          Exercise/Intervention   Notes   Sit-stand 5x  x    Quad set 5x  x Manual cuing needed to keep from performing glute set                                                                    Specific Interventions Next Treatment: NuStep; static and dynamic balance and gait activities; gait training with SPC    Activity/Treatment Tolerance:  [x]  Patient tolerated treatment well  []  Patient limited by fatigue  []  Patient limited by pain   []  Patient limited by medical complications  []  Other:     Assessment: Darren Hoffman is a [place] 56 yr old woman presenting to therapy 3 weeks s/p right TKA. She had 2 weeks of home health PT before being cleared for outpatient PT. Today's evaluation indicated deficits in ROM, strength and gait all of which are consistent with recent TKA. She will benefit from PT to work to obtain full functional ROM and strength to facilitate safe return to all activities without limitation. Body Structures/Functions/Activity Limitations: edema, impaired activity tolerance, impaired balance, impaired ROM, impaired strength, pain and abnormal gait  Prognosis: good    GOALS:  Patient Goal: Full function of knee without pain; to walk [facility] in March with vandana      Short Term Goals:  Time Frame: 4 weeks   1. Mariluz's right knee AROM will improve to 0-115 degrees allowing for reciprocal gait with stairs and to complete revolutions on the bike. 2. Talha Valle will transition from [facility] to [facility] as her right LE strength improves to 4+/5.  3. Gretchens LEFS score will improve to >50 indicating moderate to mild disability related to her knee pain    Long Term Goals:  Time Frame: 8 weeks  1. Talha Valle will be discharged from PT with independent HEP. 2. Talha Valle will ambulate without any AD. 3. Gretchens LEFS score will improve to >65/80 indicating no disability related to her knee pain. Patient Education:   [x]  HEP/Education Completed: Plan of Care, Goals,    Medbridge Access Code: Jimi Schroeder  []  No new Education completed  [x]  Reviewed Prior HEP      [x]  Patient verbalized and/or demonstrated understanding of education provided. []  Patient unable to verbalize and/or demonstrate understanding of education provided. Will continue education.   []  Barriers to learning: n/a    PLAN:  Treatment Recommendations: Strengthening, Range of Motion, Balance Training, Gait Training, Stair Training, Neuromuscular Re-education, Manual Therapy - Soft Tissue Mobilization, Manual Therapy - Joint Manipulation, Pain Management, Home Exercise Program, Patient Education and Modalities    [x]  Plan of care initiated. Plan to see patient 2 times per week for 8 weeks to address the treatment planned outlined above.   []  Continue with current plan of care  []  Modify plan of care as follows:    []  Hold pending physician visit  []  Discharge    Time In 1150   Time Out 1235   Timed Code Minutes: 30 min   Total Treatment Time: 45 min       Electronically Signed by: Reji Martin

## 2020-12-17 ENCOUNTER — HOSPITAL ENCOUNTER (OUTPATIENT)
Dept: PHYSICAL THERAPY | Age: 60
Setting detail: THERAPIES SERIES
Discharge: HOME | End: 2020-12-17
Payer: MEDICARE

## 2020-12-17 PROCEDURE — 97530 THERAPEUTIC ACTIVITIES: CPT

## 2020-12-17 PROCEDURE — 97140 MANUAL THERAPY 1/> REGIONS: CPT

## 2020-12-17 PROCEDURE — 97110 THERAPEUTIC EXERCISES: CPT

## 2020-12-17 NOTE — PROGRESS NOTES
[address] [facility]  PHYSICAL THERAPY  [] EVALUATION  [x] DAILY NOTE (LAND) [] DAILY NOTE (AQUATIC ) [] PROGRESS NOTE [] DISCHARGE NOTE    [] 615 [facility]   [] Aaron     [] [address] [place]   [x] Marialuisa Cutler    Date: 2020  Patient Name:  Jovita Mcgrath  : 1960  MRN: [id number]  CSN: [id number]    Referring Practitioner Donna Landrum MD   Diagnosis Unilateral primary osteoarthritis, right knee [M17.11]    Treatment Diagnosis Right knee pain   Date of Evaluation 20    Additional Pertinent History Kidney stones; SOB; OA      Functional Outcome Measure Used LEFS   Functional Outcome Score 35/80 (20)       Insurance: Primary: Payor: Cirilo Joshua /  /  / ,   Secondary:    Authorization Information: Pre-certification not needed    Visit # 2, 2/10 for progress note   Visits Allowed: No visit limit   Recertification Date: February 3, 2021   Physician Follow-Up: 2020   Physician Orders:    History of Present Illness:      SUBJECTIVE: Tired today; spent the last 6 hours taking care of 2 yr old granddaughter.      TREATMENT   Precautions:    Pain:     X in shaded column indicates activity completed today   Modalities Parameters/  Location  Notes                     Manual Therapy Time/Technique  Notes   STM throughout right quads, posterior knee capsule 10 min x    Posterior capsule stretch with hold for extension 3 min x          Exercise/Intervention   Notes   Sit-stand 5x  x    Quad set 5x   Manual cuing needed to keep from performing glute set   NuStep 6 min  x UE: 10; LE: 9   Knee flexion/extension on step 10x  x    Seated: ball squeezes, hip abd, LAQ 10x  x    Airex: wt shifts; marches 10x  x    Rocker board: anterior/posterior; lateral 10x  x    // bars: forward march; lateral stepping  2x  x                  ROM: +4-121 degrees          Specific Interventions Next Treatment: NuStep; static and dynamic balance and gait activities; gait training with SPC    Activity/Treatment Tolerance:  [x]  Patient tolerated treatment well  []  Patient limited by fatigue  []  Patient limited by pain   []  Patient limited by medical complications  []  Other:     Assessment: Valentina Tamez has been working very hard on improving AROM and it shows; her ROM is +4-121 degrees!!! Initiated balance and LE strengthening program.     Body Structures/Functions/Activity Limitations: edema, impaired activity tolerance, impaired balance, impaired ROM, impaired strength, pain and abnormal gait  Prognosis: good    GOALS:  Patient Goal: Full function of knee without pain; to walk [facility] in March with [employer]      Short Term Goals:  Time Frame: 4 weeks   1. Gretchens right knee AROM will improve to 0-115 degrees allowing for reciprocal gait with stairs and to complete revolutions on the bike. 2. Valentina Tamez will transition from [facility] to [facility] as her right LE strength improves to 4+/5.  3. Gretchens LEFS score will improve to >50 indicating moderate to mild disability related to her knee pain    Long Term Goals:  Time Frame: 8 weeks  1. Valentina Tamez will be discharged from PT with independent HEP. 2. Valentina Tamez will ambulate without any AD. 3. Mariluz's LEFS score will improve to >65/80 indicating no disability related to her knee pain. Patient Education:   []  HEP/Education Completed: Plan of Care, Goals,    Medbridge Access Code:  []  No new Education completed  [x]  Reviewed Prior HEP      [x]  Patient verbalized and/or demonstrated understanding of education provided. []  Patient unable to verbalize and/or demonstrate understanding of education provided. Will continue education.   []  Barriers to learning: n/a    PLAN:  Treatment Recommendations: Strengthening, Range of Motion, Balance Training, Gait Training, Stair Training, Neuromuscular Re-education, Manual Therapy - Soft Tissue Mobilization, Manual Therapy - Joint Manipulation, Pain Management, Home

## 2020-12-18 ENCOUNTER — HOSPITAL ENCOUNTER (OUTPATIENT)
Dept: PHYSICAL THERAPY | Age: 60
Setting detail: THERAPIES SERIES
End: 2020-12-18
Payer: MEDICARE

## 2020-12-24 ENCOUNTER — HOSPITAL ENCOUNTER (OUTPATIENT)
Dept: PHYSICAL THERAPY | Age: 60
Setting detail: THERAPIES SERIES
End: 2020-12-24
Payer: MEDICARE

## 2020-12-29 ENCOUNTER — HOSPITAL ENCOUNTER (OUTPATIENT)
Dept: PHYSICAL THERAPY | Age: 60
Setting detail: THERAPIES SERIES
End: 2020-12-29
Payer: MEDICARE

## 2020-12-30 RX ORDER — CELECOXIB 100 MG/1
100 CAPSULE ORAL 2 TIMES DAILY
Qty: 180 CAPSULE | Refills: 1 | Status: SHIPPED | OUTPATIENT
Start: 2020-12-30 | End: 2021-04-21 | Stop reason: SDUPTHER

## 2020-12-31 ENCOUNTER — HOSPITAL ENCOUNTER (OUTPATIENT)
Dept: PHYSICAL THERAPY | Age: 60
Setting detail: THERAPIES SERIES
Discharge: HOME | End: 2020-12-31
Payer: MEDICARE

## 2020-12-31 PROCEDURE — 97110 THERAPEUTIC EXERCISES: CPT

## 2020-12-31 NOTE — PROGRESS NOTES
[address] [facility]  PHYSICAL THERAPY  [] EVALUATION  [x] DAILY NOTE (LAND) [] DAILY NOTE (AQUATIC ) [] PROGRESS NOTE [] DISCHARGE NOTE    [] 615 [facility]   [] [address]    [] [address] [facility]   [x] Emely Martinez    Date: 2020  Patient Name:  Solomon Blank  : 1960  MRN: [id number]  CSN: [id number]    Referring Practitioner Stone Harrington MD   Diagnosis Unilateral primary osteoarthritis, right knee [M17.11]    Treatment Diagnosis Right knee pain   Date of Evaluation 20    Additional Pertinent History Kidney stones; SOB; OA      Functional Outcome Measure Used LEFS   Functional Outcome Score 35/80 (20)       Insurance: Primary: Payor: Shyam Patricia /  /  / ,   Secondary:    Authorization Information: Pre-certification not needed    Visit # 3, 3/10 for progress note   Visits Allowed: No visit limit   Recertification Date: February 3, 2021   Physician Follow-Up: 2020   Physician Orders:    History of Present Illness:      SUBJECTIVE: Pt ambulated into clinic with no AD. Pt has not been to therapy due to being sick. Pt stated she has been doing HEP. TREATMENT   Precautions:    Pain: denies    X in shaded column indicates activity completed today   Modalities Parameters/  Location  Notes                     Manual Therapy Time/Technique  Notes   STM throughout right quads, posterior knee capsule 10 min     Posterior capsule stretch with hold for extension 3 min           Exercise/Intervention   Notes   Sit-stand 5x  x    Quad set 5x   Manual cuing needed to keep from performing glute set   NuStep 5 min  x UE: 10; LE: 9   Knee flexion/extension on step, calf stretch 15sec.   3x x    Seated: ball squeezes, hip abd, LAQ, HS curls with green 10x  x    Airex: wt shifts; marches, mini squats 10x  x    R TKE with green tband 10x  x    Rocker board: anterior/posterior; lateral 15x 20 sec. bal x    // bars: forward march; lateral stepping  2x  x           Prone hang 2min  x    ROM: +3-130 degrees    x      Specific Interventions Next Treatment: NuStep; static and dynamic balance and gait activities; gait training with SPC    Activity/Treatment Tolerance:  [x]  Patient tolerated treatment well  []  Patient limited by fatigue  []  Patient limited by pain   []  Patient limited by medical complications  []  Other:     Assessment: Pt continues to improve ROM even after being sick last few visits. Main concern is R knee extension. Prone hang added to program. No pain at end of session. Body Structures/Functions/Activity Limitations: edema, impaired activity tolerance, impaired balance, impaired ROM, impaired strength, pain and abnormal gait  Prognosis: good    GOALS:  Patient Goal: Full function of knee without pain; to walk [facility] in March with vandana      Short Term Goals:  Time Frame: 4 weeks   1. Gretchens right knee AROM will improve to 0-115 degrees allowing for reciprocal gait with stairs and to complete revolutions on the bike. 2. Kailey Moore will transition from [facility] to [facility] as her right LE strength improves to 4+/5.  3. Gretchens LEFS score will improve to >50 indicating moderate to mild disability related to her knee pain    Long Term Goals:  Time Frame: 8 weeks  1. Kailey Moore will be discharged from PT with independent HEP. 2. Kailey Moore will ambulate without any AD. 3. Gretchens LEFS score will improve to >65/80 indicating no disability related to her knee pain. Patient Education:   []  HEP/Education Completed: sitting HS stretch, prone hang.  [employer] Access Code:  []  No new Education completed  [x]  Reviewed Prior HEP      [x]  Patient verbalized and/or demonstrated understanding of education provided. []  Patient unable to verbalize and/or demonstrate understanding of education provided. Will continue education.   []  Barriers to learning: n/a    PLAN:  Treatment Recommendations: Strengthening, Range of Motion, Balance Training, Gait Training, Stair Training, Neuromuscular Re-education, Manual Therapy - Soft Tissue Mobilization, Manual Therapy - Joint Manipulation, Pain Management, Home Exercise Program, Patient Education and Modalities    []  Plan of care initiated. Plan to see patient 2 times per week for 8 weeks to address the treatment planned outlined above.   [x]  Continue with current plan of care  []  Modify plan of care as follows:    []  Hold pending physician visit  []  Discharge    Time In 1000   Time Out 1045   Timed Code Minutes: 45 min   Total Treatment Time: 45 min       Electronically Signed by: Tino Dominguez

## 2021-01-05 ENCOUNTER — HOSPITAL ENCOUNTER (OUTPATIENT)
Dept: PHYSICAL THERAPY | Age: 61
Setting detail: THERAPIES SERIES
End: 2021-01-05
Payer: MEDICARE

## 2021-01-07 ENCOUNTER — HOSPITAL ENCOUNTER (OUTPATIENT)
Dept: PHYSICAL THERAPY | Age: 61
Setting detail: THERAPIES SERIES
Discharge: HOME | End: 2021-01-07
Payer: MEDICARE

## 2021-01-07 PROCEDURE — 97112 NEUROMUSCULAR REEDUCATION: CPT

## 2021-01-07 PROCEDURE — 97110 THERAPEUTIC EXERCISES: CPT

## 2021-01-07 PROCEDURE — 97530 THERAPEUTIC ACTIVITIES: CPT

## 2021-01-07 NOTE — PROGRESS NOTES
[address] [facility]  PHYSICAL THERAPY  [] EVALUATION  [] DAILY NOTE (LAND) [] DAILY NOTE (AQUATIC ) [x] PROGRESS NOTE [] DISCHARGE NOTE    [] [facility]   [] Aaron     [] [address] [employer]   [x] Ross Picking    Date: 2021  Patient Name:  Bridgette Kamara  : 1960  MRN: [id number]  CSN: [id number]    Referring Practitioner Ana Saxena MD   Diagnosis Unilateral primary osteoarthritis, right knee [M17.11]    Treatment Diagnosis Right knee pain   Date of Evaluation 20    Additional Pertinent History Kidney stones; SOB; OA      Functional Outcome Measure Used LEFS   Functional Outcome Score 35/80 (20)   60/80 (68) (21)      Insurance: Primary: Payor: Sarah Fang /  /  / ,   Secondary:    Authorization Information: Pre-certification not needed    Visit # 4, 0/8 for progress note   Visits Allowed: No visit limit   Recertification Date: February 3, 2021   Physician Follow-Up: 2020   Physician Orders:    History of Present Illness:      SUBJECTIVE: Pt ambulated into clinic with no AD. She is scheduled to see Dr. Elissa Pedersen. TREATMENT   Precautions:    Pain: denies    X in shaded column indicates activity completed today   Modalities Parameters/  Location  Notes                     Manual Therapy Time/Technique  Notes   STM throughout right quads, posterior knee capsule 10 min     Posterior capsule stretch with hold for extension 3 min           Exercise/Intervention   Notes   Sit-stand 5x  x    Forward and lateral step ups  10x  x 6 inch   NuStep 7 min  x UE: 10; LE: 9   Knee flexion/extension on step, calf stretch 15sec.   3x x    Seated: ball squeezes, hip abd, LAQ, HS curls with green 10x  x    Airex: wt shifts; marches, mini squats 10x  x    R TKE with green tband 10x  x    Rocker board: anterior/posterior; lateral 15x 20 sec. bal x    // bars: forward march; lateral stepping  2x  x           Prone hang 2min  x With MHP   ROM: 0-130 degrees    x           Review of goals; LEFS; ROM   x      Specific Interventions Next Treatment: NuStep; static and dynamic balance and gait activities; gait training with SPC    Activity/Treatment Tolerance:  [x]  Patient tolerated treatment well  []  Patient limited by fatigue  []  Patient limited by pain   []  Patient limited by medical complications  []  Other:     Assessment: Pt continues to improve ROM even after being sick last few visits. Main concern is R knee extension. Prone hang added to program. No pain at end of session. Body Structures/Functions/Activity Limitations: edema, impaired activity tolerance, impaired balance, impaired ROM, impaired strength, pain and abnormal gait  Prognosis: good    GOALS:  Patient Goal: Full function of knee without pain; to walk [facility] in March with vandana      Short Term Goals:  Time Frame: 4 weeks   1. Gretchens right knee AROM will improve to 0-115 degrees allowing for reciprocal gait with stairs and to complete revolutions on the bike. GOAL MET: ROM is 0-131 degrees   2. Brian Pagan will transition from [facility] to [facility] as her right LE strength improves to 4+/5. GOAL MET-walking with AD  3. Gretchens LEFS score will improve to >50 indicating moderate to mild disability related to her knee pain GOAL MET-improved score to 60/80    Long Term Goals:  Time Frame: 8 weeks  1. Brian Pagan will be discharged from PT with independent HEP. ONGOING  2. Brian Pagan will ambulate without any AD. GOAL MET  3. Gretchens LEFS score will improve to >65/80 indicating no disability related to her knee pain. NOT MET  NEW GOAL: Brian Pagan will be able to squat/stoop further allowing greater ease with transferring laundry out of her dryer without limitation. Patient Education:   []  HEP/Education Completed: sitting HS stretch, prone hang.    [employer] Access Code:  []  No new Education completed  [x]  Reviewed Prior HEP      [x]  Patient verbalized and/or demonstrated understanding of education provided. []  Patient unable to verbalize and/or demonstrate understanding of education provided. Will continue education. []  Barriers to learning: n/a    PLAN:  Treatment Recommendations: Strengthening, Range of Motion, Balance Training, Gait Training, Stair Training, Neuromuscular Re-education, Manual Therapy - Soft Tissue Mobilization, Manual Therapy - Joint Manipulation, Pain Management, Home Exercise Program, Patient Education and Modalities    []  Plan of care initiated. Plan to see patient 2 times per week for 8 weeks to address the treatment planned outlined above.   [x]  Continue with current plan of care  []  Modify plan of care as follows:    []  Hold pending physician visit  []  Discharge    Time In 0955   Time Out 1035   Timed Code Minutes: 40 min   Total Treatment Time: 40 min       Electronically Signed by: Sam Parsons

## 2021-01-12 ENCOUNTER — HOSPITAL ENCOUNTER (OUTPATIENT)
Dept: PHYSICAL THERAPY | Age: 61
Setting detail: THERAPIES SERIES
Discharge: HOME | End: 2021-01-12
Payer: MEDICARE

## 2021-01-12 PROCEDURE — 97110 THERAPEUTIC EXERCISES: CPT

## 2021-01-12 NOTE — PROGRESS NOTES
[address] [facility]  PHYSICAL THERAPY  [] EVALUATION  [x] DAILY NOTE (LAND) [] DAILY NOTE (AQUATIC ) [] PROGRESS NOTE [] DISCHARGE NOTE    [] [address] [facility]   [] Aaron 90    [] [address] [facility]   [x] Cindy Nunez    Date: 2021  Patient Name:  Hiro Echols  : 1960  MRN: [id number]  CSN: [id number]    Referring Practitioner Wenceslao Cox MD   Diagnosis Unilateral primary osteoarthritis, right knee [M17.11]    Treatment Diagnosis Right knee pain   Date of Evaluation 20    Additional Pertinent History Kidney stones; SOB; OA      Functional Outcome Measure Used LEFS   Functional Outcome Score 35/80 (20)   60/80 (68) (21)      Insurance: Primary: Payor: Laina Patel /  /  / ,   Secondary:    Authorization Information: Pre-certification not needed    Visit # 5,  for progress note   Visits Allowed: No visit limit   Recertification Date: February 3, 2021   Physician Follow-Up: 2020   Physician Orders:    History of Present Illness:      SUBJECTIVE: Pt stated she overdid it the other day and was carrying groceries into house when she fell over onto L hip. Pt stated she is bruised but OK and didn't hurt R knee. TREATMENT   Precautions:    Pain: denies    X in shaded column indicates activity completed today   Modalities Parameters/  Location  Notes                     Manual Therapy Time/Technique  Notes   STM throughout right quads, posterior knee capsule 10 min     Posterior capsule stretch with hold for extension 3 min           Exercise/Intervention   Notes   Sit-stand 5x  x    Forward and lateral step ups  15x  x 6 inch   NuStep 6 min  x UE: 10; LE: 9   Knee flexion/extension on step, calf stretch 15sec. 3x x    Seated: ball squeezes, hip abd, LAQ, HS curls with green 10x      Airex: wt shifts; marches 15x  x    R TKE with green tband 15x  x    Rocker board: anterior/posterior; lateral 15x 20 sec. bal x    // bars: forward march; lateral stepping,retro 2x  x    TG squats, heel raise 10x 2 sets x           Prone hang 3min  x With MHP   ROM: 0-130 degrees               Review of goals; LEFS; ROM         Specific Interventions Next Treatment: NuStep; static and dynamic balance and gait activities; gait training with SPC    Activity/Treatment Tolerance:  [x]  Patient tolerated treatment well  []  Patient limited by fatigue  []  Patient limited by pain   []  Patient limited by medical complications  []  Other:     Assessment: Progressed patient with TG and 15 reps of standing exercises. R Knee extension is improving. No loss of balance during session. Cues continue for upright posture    Body Structures/Functions/Activity Limitations: edema, impaired activity tolerance, impaired balance, impaired ROM, impaired strength, pain and abnormal gait  Prognosis: good    GOALS:  Patient Goal: Full function of knee without pain; to walk [facility] in March with vandana      Short Term Goals:  Time Frame: 4 weeks   1. Gretchens right knee AROM will improve to 0-115 degrees allowing for reciprocal gait with stairs and to complete revolutions on the bike. GOAL MET: ROM is 0-131 degrees   2. Pantera Otto will transition from [facility] to [facility] as her right LE strength improves to 4+/5. GOAL MET-walking with AD  3. Gretchens LEFS score will improve to >50 indicating moderate to mild disability related to her knee pain GOAL MET-improved score to 60/80    Long Term Goals:  Time Frame: 8 weeks  1. Pantera Otto will be discharged from PT with independent HEP. ONGOING  2. Pantera Otto will ambulate without any AD. GOAL MET  3. Gretchens LEFS score will improve to >65/80 indicating no disability related to her knee pain. NOT MET  NEW GOAL: Pantera Otto will be able to squat/stoop further allowing greater ease with transferring laundry out of her dryer without limitation.        Patient Education:   []  HEP/Education Completed: sitting HS stretch, prone catina   [facility] Access Code:  []  No new Education completed  [x]  Reviewed Prior HEP      [x]  Patient verbalized and/or demonstrated understanding of education provided. []  Patient unable to verbalize and/or demonstrate understanding of education provided. Will continue education. []  Barriers to learning: n/a    PLAN:  Treatment Recommendations: Strengthening, Range of Motion, Balance Training, Gait Training, Stair Training, Neuromuscular Re-education, Manual Therapy - Soft Tissue Mobilization, Manual Therapy - Joint Manipulation, Pain Management, Home Exercise Program, Patient Education and Modalities    []  Plan of care initiated. Plan to see patient 2 times per week for 8 weeks to address the treatment planned outlined above.   [x]  Continue with current plan of care  []  Modify plan of care as follows:    []  Hold pending physician visit  []  Discharge    Time In 937   Time Out 1020   Timed Code Minutes: 43 min   Total Treatment Time: 43 min       Electronically Signed by: Azalea Welch

## 2021-01-14 ENCOUNTER — HOSPITAL ENCOUNTER (OUTPATIENT)
Dept: PHYSICAL THERAPY | Age: 61
Setting detail: THERAPIES SERIES
Discharge: HOME | End: 2021-01-14
Payer: MEDICARE

## 2021-01-14 PROCEDURE — 97110 THERAPEUTIC EXERCISES: CPT

## 2021-01-14 NOTE — PROGRESS NOTES
[address] [facility]  PHYSICAL THERAPY  [] EVALUATION  [x] DAILY NOTE (LAND) [] DAILY NOTE (AQUATIC ) [] PROGRESS NOTE [] DISCHARGE NOTE    [] [address] [facility]   [] Aaron 90    [] [address] [facility]   [x] Ana Maria Valdez    Date: 2021  Patient Name:  Praveena Iglesias  : 1960  MRN: [id number]  CSN: [id number]    Referring Practitioner Jewell Lawrence MD   Diagnosis Unilateral primary osteoarthritis, right knee [M17.11]    Treatment Diagnosis Right knee pain   Date of Evaluation 20    Additional Pertinent History Kidney stones; SOB; OA      Functional Outcome Measure Used LEFS   Functional Outcome Score 35/80 (20)   60/80 (68) (21)      Insurance: Primary: Payor: Madalyn Tee /  /  / ,   Secondary:    Authorization Information: Pre-certification not needed    Visit # 6, 2/8 for progress note   Visits Allowed: No visit limit   Recertification Date: February 3, 2021   Physician Follow-Up: 2020   Physician Orders:    History of Present Illness:      SUBJECTIVE: Pt stated most of day she had no pain in R knee. Once in a while when sitting to long and she stands pain shoots through R knee joint. TREATMENT   Precautions:    Pain: denies    X in shaded column indicates activity completed today   Modalities Parameters/  Location  Notes                     Manual Therapy Time/Technique  Notes   STM throughout right quads, posterior knee capsule 10 min     Posterior capsule stretch with hold for extension 3 min           Exercise/Intervention   Notes   Sit-stand 10x  x    Forward and lateral step ups  15x  x 6 inch   NuStep 6 min  x UE: 10; LE: 9   Knee flexion/extension on step, calf stretch 15sec.   3x x    Seated: ball squeezes, hip abd, LAQ, HS curls with green 15x  x    Airex: wt shifts; marches 15x  x    R TKE with green tband 15x  x    Rocker board: anterior/posterior; lateral 15x 20 sec. bal x    // bars:line walk, forward march; lateral stepping,retro 2x  x    TG squats, heel raise with ball between knees 10x 2 sets x                  Prone hang 3min   With P   ROM: 0-130 degrees               Review of goals; LEFS; ROM         Specific Interventions Next Treatment: NuStep; static and dynamic balance and gait activities; gait training with SPC    Activity/Treatment Tolerance:  [x]  Patient tolerated treatment well  []  Patient limited by fatigue  []  Patient limited by pain   []  Patient limited by medical complications  []  Other:     Assessment: Pt able to stand with no UE support from mat table! Pt continues to improve strength in R knee. Cues to watch alignment to decrease genu valgus posture. Body Structures/Functions/Activity Limitations: edema, impaired activity tolerance, impaired balance, impaired ROM, impaired strength, pain and abnormal gait  Prognosis: good    GOALS:  Patient Goal: Full function of knee without pain; to walk [facility] in March with vandana      Short Term Goals:  Time Frame: 4 weeks   1. Gretchens right knee AROM will improve to 0-115 degrees allowing for reciprocal gait with stairs and to complete revolutions on the bike. GOAL MET: ROM is 0-131 degrees   2. Curly Vang will transition from [facility] to [facility] as her right LE strength improves to 4+/5. GOAL MET-walking with AD  3. Gretchens LEFS score will improve to >50 indicating moderate to mild disability related to her knee pain GOAL MET-improved score to 60/80    Long Term Goals:  Time Frame: 8 weeks  1. Curly Vang will be discharged from PT with independent HEP. ONGOING  2. Curly Vang will ambulate without any AD. GOAL MET  3. Gretchens LEFS score will improve to >65/80 indicating no disability related to her knee pain. NOT MET  NEW GOAL: Curly Vang will be able to squat/stoop further allowing greater ease with transferring laundry out of her dryer without limitation.        Patient Education:   []  HEP/Education Completed: sitting HS stretch, prone

## 2021-01-19 ENCOUNTER — HOSPITAL ENCOUNTER (OUTPATIENT)
Dept: PHYSICAL THERAPY | Age: 61
Setting detail: THERAPIES SERIES
Discharge: HOME | End: 2021-01-19
Payer: MEDICARE

## 2021-01-19 PROCEDURE — 97110 THERAPEUTIC EXERCISES: CPT

## 2021-01-19 NOTE — PROGRESS NOTES
[facility]  [] EVALUATION  [x] DAILY NOTE (LAND) [] DAILY NOTE (AQUATIC ) [] PROGRESS NOTE [] DISCHARGE NOTE    [] 615 [facility]   [] Aaron 90    [] [address] [facility]   [x] Иван Small    Date: 2021  Patient Name:  Abiola Rodriguez  : 1960  MRN: [id number]  CSN: [id number]    Referring Practitioner Reji Estrada MD   Diagnosis Unilateral primary osteoarthritis, right knee [M17.11]    Treatment Diagnosis Right knee pain   Date of Evaluation 20    Additional Pertinent History Kidney stones; SOB; OA      Functional Outcome Measure Used LEFS   Functional Outcome Score 35/80 (20)   60/80 (68) (21)      Insurance: Primary: Payor: Jim Cook /  /  / ,   Secondary:    Authorization Information: Pre-certification not needed    Visit # 6, 3/8 for progress note   Visits Allowed: No visit limit   Recertification Date: February 3, 2021   Physician Follow-Up: 2020   Physician Orders:    History of Present Illness:      SUBJECTIVE: Pt stated R knee feels good today. Pt stated her daughter wants to buy a bike for her in a couple of months. TREATMENT   Precautions:    Pain: denies    X in shaded column indicates activity completed today   Modalities Parameters/  Location  Notes                     Manual Therapy Time/Technique  Notes   STM throughout right quads, posterior knee capsule 10 min     Posterior capsule stretch with hold for extension 3 min           Exercise/Intervention   Notes   Sit-stand 10x  x    Forward and lateral step ups  15x  x 6 inch   NuStep 6 min   UE: 10; LE: 9   Bike seat 6 5min.  x Level 4   Knee flexion/extension on step, calf stretch 15sec.   3x x    Seated: ball squeezes, hip abd, LAQ, HS curls with green 15x  x    Airex: wt shifts; marches 15x  x    R TKE with green tband 15x  x    Rocker board: anterior/posterior; lateral 15x 20 sec. bal x    // bars:line walk,  forward march; lateral stepping,retro 2x  x    TG squats, heel raise with ball between knees 10x 2 sets x    Quad sets with towel roll under ankle, overpressure from therapist 10x  x           Prone hang 3min   With MHP   ROM:2-132 degrees    x           Review of goals; LEFS; ROM         Specific Interventions Next Treatment: NuStep; static and dynamic balance and gait activities; gait training with SPC    Activity/Treatment Tolerance:  [x]  Patient tolerated treatment well  []  Patient limited by fatigue  []  Patient limited by pain   []  Patient limited by medical complications  []  Other:     Assessment: Progressed patient to bike this date. Pt able to make full revolution. No knee pain during exercises. Continued work on last few degrees of extension. Body Structures/Functions/Activity Limitations: edema, impaired activity tolerance, impaired balance, impaired ROM, impaired strength, pain and abnormal gait  Prognosis: good    GOALS:  Patient Goal: Full function of knee without pain; to walk [facility] in March with vandana      Short Term Goals:  Time Frame: 4 weeks   1. Gretchens right knee AROM will improve to 0-115 degrees allowing for reciprocal gait with stairs and to complete revolutions on the bike. GOAL MET: ROM is 0-131 degrees   2. Braden Lucas will transition from [facility] to [facility] as her right LE strength improves to 4+/5. GOAL MET-walking with AD  3. Gretchens LEFS score will improve to >50 indicating moderate to mild disability related to her knee pain GOAL MET-improved score to 60/80    Long Term Goals:  Time Frame: 8 weeks  1. Braden Lucas will be discharged from PT with independent HEP. ONGOING  2. Braden Lucas will ambulate without any AD. GOAL MET  3. Gretchens LEFS score will improve to >65/80 indicating no disability related to her knee pain. NOT MET  NEW GOAL: Braden Lucas will be able to squat/stoop further allowing greater ease with transferring laundry out of her dryer without limitation.        Patient Education:    [x]  HEP/Education Completed: focus on R knee extension.  [employer] Access Code:  []  No new Education completed  []  Reviewed Prior HEP      [x]  Patient verbalized and/or demonstrated understanding of education provided. []  Patient unable to verbalize and/or demonstrate understanding of education provided. Will continue education. []  Barriers to learning: n/a    PLAN:  Treatment Recommendations: Strengthening, Range of Motion, Balance Training, Gait Training, Stair Training, Neuromuscular Re-education, Manual Therapy - Soft Tissue Mobilization, Manual Therapy - Joint Manipulation, Pain Management, Home Exercise Program, Patient Education and Modalities    []  Plan of care initiated. Plan to see patient 2 times per week for 8 weeks to address the treatment planned outlined above.   [x]  Continue with current plan of care  []  Modify plan of care as follows:    []  Hold pending physician visit  []  Discharge    Time In 1005   Time Out 1045   Timed Code Minutes: 40 min   Total Treatment Time: 40 min       Electronically Signed by: Jessy Mejía

## 2021-01-21 ENCOUNTER — HOSPITAL ENCOUNTER (OUTPATIENT)
Dept: PHYSICAL THERAPY | Age: 61
Setting detail: THERAPIES SERIES
End: 2021-01-21
Payer: MEDICARE

## 2021-01-26 ENCOUNTER — APPOINTMENT (OUTPATIENT)
Dept: PHYSICAL THERAPY | Age: 61
End: 2021-01-26
Payer: MEDICARE

## 2021-01-28 ENCOUNTER — HOSPITAL ENCOUNTER (OUTPATIENT)
Dept: PHYSICAL THERAPY | Age: 61
Setting detail: THERAPIES SERIES
Discharge: HOME | End: 2021-01-28
Payer: MEDICARE

## 2021-01-28 PROCEDURE — 97530 THERAPEUTIC ACTIVITIES: CPT

## 2021-01-28 PROCEDURE — 97110 THERAPEUTIC EXERCISES: CPT

## 2021-01-28 NOTE — PROGRESS NOTES
[address] [facility]  PHYSICAL THERAPY  [] EVALUATION  [x] DAILY NOTE (LAND) [] DAILY NOTE (AQUATIC ) [] PROGRESS NOTE [] DISCHARGE NOTE    [] 615 [facility]   [] Aaron 90    [] [address] [facility]   [x] Edith Conn    Date: 2021  Patient Name:  Mendoza Dawn  : 1960  MRN: [id number]  CSN: [id number]    Referring Practitioner Sherri Roman MD   Diagnosis Unilateral primary osteoarthritis, right knee [M17.11]    Treatment Diagnosis Right knee pain   Date of Evaluation 20    Additional Pertinent History Kidney stones; SOB; OA      Functional Outcome Measure Used LEFS   Functional Outcome Score 35/80 (20)   60/80 (68) (21)      Insurance: Primary: Payor: Rachell Hacektt /  /  / ,   Secondary:    Authorization Information: Pre-certification not needed    Visit # 7, 4/8 for progress note   Visits Allowed: No visit limit   Recertification Date: February 3, 2021   Physician Follow-Up: 2020   Physician Orders:    History of Present Illness:      SUBJECTIVE: Pt 15 min late due to her ride being late to pick her up. She went down to [facility] and had a ball with her grandkids! TREATMENT   Precautions:    Pain: denies    X in shaded column indicates activity completed today   Modalities Parameters/  Location  Notes                     Manual Therapy Time/Technique  Notes   STM throughout right quads, posterior knee capsule 10 min     Posterior capsule stretch with hold for extension 3 min           Exercise/Intervention   Notes   Sit-stand 10x  x    Forward and lateral step ups  15x  x 6 inch   NuStep 6 min  x UE: 10; LE: 9   Bike seat 6 5min. Level 4   Knee flexion/extension on step, calf stretch 15sec.   3x x    Seated: ball squeezes, hip abd, LAQ, HS curls with green 15x      Airex: wt shifts; marches 15x  x    R TKE with green tband 15x      Rocker board: anterior/posterior; lateral 15x 20 sec. bal x    // Education:    [x]  HEP/Education Completed: focus on R knee extension.  [employer] Access Code:  []  No new Education completed  []  Reviewed Prior HEP      [x]  Patient verbalized and/or demonstrated understanding of education provided. []  Patient unable to verbalize and/or demonstrate understanding of education provided. Will continue education. []  Barriers to learning: n/a    PLAN:  Treatment Recommendations: Strengthening, Range of Motion, Balance Training, Gait Training, Stair Training, Neuromuscular Re-education, Manual Therapy - Soft Tissue Mobilization, Manual Therapy - Joint Manipulation, Pain Management, Home Exercise Program, Patient Education and Modalities    []  Plan of care initiated. Plan to see patient 2 times per week for 8 weeks to address the treatment planned outlined above.   [x]  Continue with current plan of care  []  Modify plan of care as follows:    []  Hold pending physician visit  []  Discharge    Time In 1015   Time Out 1045   Timed Code Minutes: 30 min   Total Treatment Time: 30 min       Electronically Signed by: Ada Castro

## 2021-02-02 ENCOUNTER — HOSPITAL ENCOUNTER (OUTPATIENT)
Dept: PHYSICAL THERAPY | Age: 61
Setting detail: THERAPIES SERIES
Discharge: HOME | End: 2021-02-02
Payer: MEDICARE

## 2021-02-02 PROCEDURE — 97110 THERAPEUTIC EXERCISES: CPT

## 2021-02-02 PROCEDURE — 97530 THERAPEUTIC ACTIVITIES: CPT

## 2021-02-02 NOTE — DISCHARGE SUMMARY
7[address] [facility]  PHYSICAL THERAPY  [] EVALUATION  [] DAILY NOTE (LAND) [] DAILY NOTE (AQUATIC ) [] PROGRESS NOTE [x] DISCHARGE NOTE    [] [facility]   [] Aaron Stafford    [] [address] [facility]   [x] Vahe Sullivan [facility]    Date: 2021  Patient Name:  Hugo Gamboa  : 1960  MRN: [id number]  CSN: [id number]    Referring Practitioner Renetta Franco MD   Diagnosis Unilateral primary osteoarthritis, right knee [M17.11]    Treatment Diagnosis Right knee pain   Date of Evaluation 20    Additional Pertinent History Kidney stones; SOB; OA      Functional Outcome Measure Used LEFS   Functional Outcome Score 35/80 (20)   60/80 (68) (21)  62/80 (68) (21)      Insurance: Primary: Payor: Aron Farmer /  /  / ,   Secondary:    Authorization Information: Pre-certification not needed    Visit # 8,  for progress note   Visits Allowed: No visit limit   Recertification Date: February 3, 2021   Physician Follow-Up: 2020   Physician Orders:    History of Present Illness:      SUBJECTIVE: Feeling good today; agrees to discharge     TREATMENT   Precautions:    Pain: denies    X in shaded column indicates activity completed today   Modalities Parameters/  Location  Notes                     Manual Therapy Time/Technique  Notes   STM throughout right quads, posterior knee capsule 10 min     Posterior capsule stretch with hold for extension 3 min           Exercise/Intervention   Notes   Sit-stand 10x  x    Forward and lateral step ups  15x  x 6 inch   NuStep 6 min  x UE: 10; LE: 9   Bike seat 6 5min. Level 4   Knee flexion/extension on step, calf stretch 15sec.   3x x    Seated: ball squeezes, hip abd, LAQ, HS curls with green 15x      Airex: wt shifts; marches 15x  x    R TKE with green tband 15x      Rocker board: anterior/posterior; lateral 15x 20 sec. bal x    // bars:line walk,  forward march; lateral stepping,retro 2x  x TG squats, heel raise with ball between knees 10x 2 sets     Quad sets with towel roll under ankle, overpressure from therapist 10x      NK table: flexion 15x   7.5#   Prone hang 3min   With MHP   ROM:2-132 degrees    x           Review of goals; LEFS; ROM         Specific Interventions Next Treatment: NuStep; static and dynamic balance and gait activities; gait training with SPC    Activity/Treatment Tolerance:  [x]  Patient tolerated treatment well  []  Patient limited by fatigue  []  Patient limited by pain   []  Patient limited by medical complications  []  Other:     Assessment: Dorothea Quinonez is discharged from PT with independent HEP. She met 95% of her goals and is doing very well! Body Structures/Functions/Activity Limitations: edema, impaired activity tolerance, impaired balance, impaired ROM, impaired strength, pain and abnormal gait  Prognosis: good    GOALS:  Patient Goal: Full function of knee without pain; to walk [facility] in March with vandana      Short Term Goals:  Time Frame: 4 weeks   1. Gretchens right knee AROM will improve to 0-115 degrees allowing for reciprocal gait with stairs and to complete revolutions on the bike. GOAL MET: ROM is 0-131 degrees   2. Dorothea Quinonez will transition from [facility] to [facility] as her right LE strength improves to 4+/5. GOAL MET-walking with AD  3. Gretchens LEFS score will improve to >50 indicating moderate to mild disability related to her knee pain GOAL MET-improved score to 60/80    Long Term Goals:  Time Frame: 8 weeks  1. Dorothea Quinonez will be discharged from PT with independent HEP. ONGOING  2. Dorothea Quinonez will ambulate without any AD. GOAL MET  3. Gretchens LEFS score will improve to >65/80 indicating no disability related to her knee pain. IMPROVED to 62  4. Dorothea Quinonez will be able to squat/stoop further allowing greater ease with transferring laundry out of her dryer without limitation.  GOAL MET       Patient Education:    []  HEP/Education Completed: focus on R knee extension.  [facility] Access Code:  []  No new Education completed  []  Reviewed Prior HEP      [x]  Patient verbalized and/or demonstrated understanding of education provided. []  Patient unable to verbalize and/or demonstrate understanding of education provided. Will continue education. []  Barriers to learning: n/a    PLAN:  Treatment Recommendations: Strengthening, Range of Motion, Balance Training, Gait Training, Stair Training, Neuromuscular Re-education, Manual Therapy - Soft Tissue Mobilization, Manual Therapy - Joint Manipulation, Pain Management, Home Exercise Program, Patient Education and Modalities    []  Plan of care initiated. Plan to see patient 2 times per week for 8 weeks to address the treatment planned outlined above.   []  Continue with current plan of care  []  Modify plan of care as follows:    []  Hold pending physician visit  [x]  Discharge    Time In 1000   Time Out 1032   Timed Code Minutes: 32 min   Total Treatment Time: 32 min       Electronically Signed by: Consuelo Dexter

## 2021-02-04 ENCOUNTER — APPOINTMENT (OUTPATIENT)
Dept: PHYSICAL THERAPY | Age: 61
End: 2021-02-04
Payer: MEDICARE

## 2021-04-14 ENCOUNTER — TELEPHONE (OUTPATIENT)
Dept: FAMILY MEDICINE CLINIC | Age: 61
End: 2021-04-14

## 2021-04-21 DIAGNOSIS — E03.4 HYPOTHYROIDISM DUE TO ACQUIRED ATROPHY OF THYROID: ICD-10-CM

## 2021-04-21 DIAGNOSIS — M17.11 PRIMARY OSTEOARTHRITIS OF RIGHT KNEE: ICD-10-CM

## 2021-04-21 DIAGNOSIS — E78.2 MIXED HYPERLIPIDEMIA: ICD-10-CM

## 2021-04-21 DIAGNOSIS — Z72.0 TOBACCO ABUSE DISORDER: Primary | ICD-10-CM

## 2021-04-21 DIAGNOSIS — K21.00 GASTROESOPHAGEAL REFLUX DISEASE WITH ESOPHAGITIS WITHOUT HEMORRHAGE: ICD-10-CM

## 2021-04-21 DIAGNOSIS — N18.4 CHRONIC KIDNEY DISEASE, STAGE 4 (SEVERE) (HCC): ICD-10-CM

## 2021-04-21 RX ORDER — CELECOXIB 100 MG/1
100 CAPSULE ORAL 2 TIMES DAILY
Qty: 60 CAPSULE | Refills: 0 | Status: SHIPPED
Start: 2021-04-21 | End: 2021-05-11 | Stop reason: SINTOL

## 2021-04-23 NOTE — TELEPHONE ENCOUNTER
Elayne Rangel created labs on 04-. I mailed them to the patient today written on the top of the orders to have them done before 05-.

## 2021-05-03 ENCOUNTER — NURSE ONLY (OUTPATIENT)
Dept: LAB | Age: 61
End: 2021-05-03

## 2021-05-03 DIAGNOSIS — M17.11 PRIMARY OSTEOARTHRITIS OF RIGHT KNEE: ICD-10-CM

## 2021-05-03 DIAGNOSIS — E78.2 MIXED HYPERLIPIDEMIA: ICD-10-CM

## 2021-05-03 DIAGNOSIS — K21.00 GASTROESOPHAGEAL REFLUX DISEASE WITH ESOPHAGITIS WITHOUT HEMORRHAGE: ICD-10-CM

## 2021-05-03 DIAGNOSIS — E03.4 HYPOTHYROIDISM DUE TO ACQUIRED ATROPHY OF THYROID: ICD-10-CM

## 2021-05-03 DIAGNOSIS — N18.4 CHRONIC KIDNEY DISEASE, STAGE 4 (SEVERE) (HCC): ICD-10-CM

## 2021-05-03 LAB
ALBUMIN SERPL-MCNC: 3.5 G/DL (ref 3.5–5.1)
ALP BLD-CCNC: 90 U/L (ref 38–126)
ALT SERPL-CCNC: 7 U/L (ref 11–66)
ANION GAP SERPL CALCULATED.3IONS-SCNC: 11 MEQ/L (ref 8–16)
AST SERPL-CCNC: 13 U/L (ref 5–40)
BASOPHILS # BLD: 0.4 %
BASOPHILS ABSOLUTE: 0.1 THOU/MM3 (ref 0–0.1)
BILIRUB SERPL-MCNC: 0.3 MG/DL (ref 0.3–1.2)
BUN BLDV-MCNC: 13 MG/DL (ref 7–22)
CALCIUM SERPL-MCNC: 9.3 MG/DL (ref 8.5–10.5)
CHLORIDE BLD-SCNC: 107 MEQ/L (ref 98–111)
CHOLESTEROL, TOTAL: 145 MG/DL (ref 100–199)
CO2: 22 MEQ/L (ref 23–33)
CREAT SERPL-MCNC: 1.4 MG/DL (ref 0.4–1.2)
EOSINOPHIL # BLD: 0.6 %
EOSINOPHILS ABSOLUTE: 0.1 THOU/MM3 (ref 0–0.4)
ERYTHROCYTE [DISTWIDTH] IN BLOOD BY AUTOMATED COUNT: 15.1 % (ref 11.5–14.5)
ERYTHROCYTE [DISTWIDTH] IN BLOOD BY AUTOMATED COUNT: 53.1 FL (ref 35–45)
GFR SERPL CREATININE-BSD FRML MDRD: 38 ML/MIN/1.73M2
GLUCOSE BLD-MCNC: 98 MG/DL (ref 70–108)
HCT VFR BLD CALC: 37.9 % (ref 37–47)
HDLC SERPL-MCNC: 56 MG/DL
HEMOGLOBIN: 11.9 GM/DL (ref 12–16)
IMMATURE GRANS (ABS): 0.11 THOU/MM3 (ref 0–0.07)
IMMATURE GRANULOCYTES: 0.9 %
LDL CHOLESTEROL CALCULATED: 69 MG/DL
LYMPHOCYTES # BLD: 11.7 %
LYMPHOCYTES ABSOLUTE: 1.5 THOU/MM3 (ref 1–4.8)
MCH RBC QN AUTO: 30.7 PG (ref 26–33)
MCHC RBC AUTO-ENTMCNC: 31.4 GM/DL (ref 32.2–35.5)
MCV RBC AUTO: 97.7 FL (ref 81–99)
MONOCYTES # BLD: 6.9 %
MONOCYTES ABSOLUTE: 0.9 THOU/MM3 (ref 0.4–1.3)
NUCLEATED RED BLOOD CELLS: 0 /100 WBC
PLATELET # BLD: 333 THOU/MM3 (ref 130–400)
PMV BLD AUTO: 11.5 FL (ref 9.4–12.4)
POTASSIUM SERPL-SCNC: 3.4 MEQ/L (ref 3.5–5.2)
RBC # BLD: 3.88 MILL/MM3 (ref 4.2–5.4)
SEG NEUTROPHILS: 79.5 %
SEGMENTED NEUTROPHILS ABSOLUTE COUNT: 10 THOU/MM3 (ref 1.8–7.7)
SODIUM BLD-SCNC: 140 MEQ/L (ref 135–145)
T4 FREE: 0.83 NG/DL (ref 0.93–1.76)
TOTAL PROTEIN: 7.2 G/DL (ref 6.1–8)
TRIGL SERPL-MCNC: 99 MG/DL (ref 0–199)
TSH SERPL DL<=0.05 MIU/L-ACNC: 31.44 UIU/ML (ref 0.4–4.2)
VITAMIN D 25-HYDROXY: 25 NG/ML (ref 30–100)
WBC # BLD: 12.6 THOU/MM3 (ref 4.8–10.8)

## 2021-05-11 ENCOUNTER — TELEPHONE (OUTPATIENT)
Dept: FAMILY MEDICINE CLINIC | Age: 61
End: 2021-05-11

## 2021-05-11 ENCOUNTER — HOSPITAL ENCOUNTER (OUTPATIENT)
Dept: GENERAL RADIOLOGY | Age: 61
Discharge: HOME | End: 2021-05-11
Payer: MEDICARE

## 2021-05-11 ENCOUNTER — OFFICE VISIT (OUTPATIENT)
Dept: FAMILY MEDICINE CLINIC | Age: 61
End: 2021-05-11
Payer: MEDICARE

## 2021-05-11 ENCOUNTER — HOSPITAL ENCOUNTER (OUTPATIENT)
Age: 61
Discharge: HOME | End: 2021-05-11
Payer: MEDICARE

## 2021-05-11 VITALS
DIASTOLIC BLOOD PRESSURE: 92 MMHG | BODY MASS INDEX: 28.12 KG/M2 | HEART RATE: 97 BPM | SYSTOLIC BLOOD PRESSURE: 164 MMHG | WEIGHT: 175 LBS | HEIGHT: 66 IN | TEMPERATURE: 97.7 F | OXYGEN SATURATION: 99 %

## 2021-05-11 DIAGNOSIS — J84.9 INTERSTITIAL LUNG DISEASE (HCC): ICD-10-CM

## 2021-05-11 DIAGNOSIS — R09.89 BILATERAL RALES: ICD-10-CM

## 2021-05-11 DIAGNOSIS — Z71.89 COUNSELING REGARDING ADVANCED DIRECTIVES: ICD-10-CM

## 2021-05-11 DIAGNOSIS — Z00.00 ROUTINE GENERAL MEDICAL EXAMINATION AT A HEALTH CARE FACILITY: Primary | ICD-10-CM

## 2021-05-11 DIAGNOSIS — E03.4 HYPOTHYROIDISM DUE TO ACQUIRED ATROPHY OF THYROID: ICD-10-CM

## 2021-05-11 DIAGNOSIS — M17.11 LOCALIZED OSTEOARTHRITIS OF RIGHT KNEE: ICD-10-CM

## 2021-05-11 DIAGNOSIS — R91.8 RIGHT LOWER LOBE LUNG MASS: ICD-10-CM

## 2021-05-11 DIAGNOSIS — N39.3 STRESS INCONTINENCE: ICD-10-CM

## 2021-05-11 PROCEDURE — 71046 X-RAY EXAM CHEST 2 VIEWS: CPT

## 2021-05-11 PROCEDURE — 3017F COLORECTAL CA SCREEN DOC REV: CPT | Performed by: NURSE PRACTITIONER

## 2021-05-11 PROCEDURE — G0438 PPPS, INITIAL VISIT: HCPCS | Performed by: NURSE PRACTITIONER

## 2021-05-11 RX ORDER — FUROSEMIDE 20 MG/1
20 TABLET ORAL DAILY
Qty: 30 TABLET | Refills: 1 | Status: SHIPPED | OUTPATIENT
Start: 2021-05-11 | End: 2021-08-13 | Stop reason: SDUPTHER

## 2021-05-11 RX ORDER — POTASSIUM CHLORIDE 750 MG/1
10 TABLET, EXTENDED RELEASE ORAL 2 TIMES DAILY
Qty: 60 TABLET | Refills: 1 | Status: SHIPPED | OUTPATIENT
Start: 2021-05-11 | End: 2022-06-10 | Stop reason: SDUPTHER

## 2021-05-11 RX ORDER — OXYBUTYNIN CHLORIDE 5 MG/1
5 TABLET ORAL 3 TIMES DAILY
Qty: 90 TABLET | Refills: 0 | Status: SHIPPED | OUTPATIENT
Start: 2021-05-11 | End: 2021-08-13 | Stop reason: SDUPTHER

## 2021-05-11 RX ORDER — LEVOTHYROXINE SODIUM 0.03 MG/1
12.5 TABLET ORAL DAILY
Qty: 45 TABLET | Refills: 0 | Status: SHIPPED | OUTPATIENT
Start: 2021-05-11 | End: 2022-06-10 | Stop reason: SDUPTHER

## 2021-05-11 RX ORDER — ACETAMINOPHEN 500 MG
1000 TABLET ORAL 3 TIMES DAILY PRN
Qty: 180 TABLET | Refills: 0 | Status: SHIPPED | OUTPATIENT
Start: 2021-05-11

## 2021-05-11 ASSESSMENT — ENCOUNTER SYMPTOMS
EYE PAIN: 0
ALLERGIC/IMMUNOLOGIC NEGATIVE: 1
DIARRHEA: 0
COUGH: 1
WHEEZING: 0
BACK PAIN: 1
EYE ITCHING: 0
ABDOMINAL DISTENTION: 0
NAUSEA: 0
ABDOMINAL PAIN: 0
SHORTNESS OF BREATH: 1
EYE REDNESS: 0
PHOTOPHOBIA: 0
CONSTIPATION: 0
CHEST TIGHTNESS: 0

## 2021-05-11 ASSESSMENT — LIFESTYLE VARIABLES
HOW OFTEN DO YOU HAVE A DRINK CONTAINING ALCOHOL: MONTHLY OR LESS
HOW OFTEN DURING THE LAST YEAR HAVE YOU NEEDED AN ALCOHOLIC DRINK FIRST THING IN THE MORNING TO GET YOURSELF GOING AFTER A NIGHT OF HEAVY DRINKING: 0
HAS A RELATIVE, FRIEND, DOCTOR, OR ANOTHER HEALTH PROFESSIONAL EXPRESSED CONCERN ABOUT YOUR DRINKING OR SUGGESTED YOU CUT DOWN: NO
AUDIT TOTAL SCORE: 1
HOW OFTEN DURING THE LAST YEAR HAVE YOU FOUND THAT YOU WERE NOT ABLE TO STOP DRINKING ONCE YOU HAD STARTED: NEVER
AUDIT-C TOTAL SCORE: 0
HOW OFTEN DURING THE LAST YEAR HAVE YOU NEEDED AN ALCOHOLIC DRINK FIRST THING IN THE MORNING TO GET YOURSELF GOING AFTER A NIGHT OF HEAVY DRINKING: NEVER
HOW OFTEN DURING THE LAST YEAR HAVE YOU FAILED TO DO WHAT WAS NORMALLY EXPECTED FROM YOU BECAUSE OF DRINKING: NEVER
HAVE YOU OR SOMEONE ELSE BEEN INJURED AS A RESULT OF YOUR DRINKING: NO
HOW MANY STANDARD DRINKS CONTAINING ALCOHOL DO YOU HAVE ON A TYPICAL DAY: ONE OR TWO
HOW OFTEN DURING THE LAST YEAR HAVE YOU HAD A FEELING OF GUILT OR REMORSE AFTER DRINKING: NEVER
HOW MANY STANDARD DRINKS CONTAINING ALCOHOL DO YOU HAVE ON A TYPICAL DAY: 0
AUDIT TOTAL SCORE: 0
HAS A RELATIVE, FRIEND, DOCTOR, OR ANOTHER HEALTH PROFESSIONAL EXPRESSED CONCERN ABOUT YOUR DRINKING OR SUGGESTED YOU CUT DOWN: 0
HOW OFTEN DURING THE LAST YEAR HAVE YOU FAILED TO DO WHAT WAS NORMALLY EXPECTED FROM YOU BECAUSE OF DRINKING: 0
HOW OFTEN DURING THE LAST YEAR HAVE YOU BEEN UNABLE TO REMEMBER WHAT HAPPENED THE NIGHT BEFORE BECAUSE YOU HAD BEEN DRINKING: NEVER
HOW OFTEN DO YOU HAVE SIX OR MORE DRINKS ON ONE OCCASION: 0
HOW OFTEN DO YOU HAVE SIX OR MORE DRINKS ON ONE OCCASION: NEVER

## 2021-05-11 ASSESSMENT — PATIENT HEALTH QUESTIONNAIRE - PHQ9
2. FEELING DOWN, DEPRESSED OR HOPELESS: 0
SUM OF ALL RESPONSES TO PHQ9 QUESTIONS 1 & 2: 0
SUM OF ALL RESPONSES TO PHQ QUESTIONS 1-9: 0

## 2021-05-11 NOTE — PATIENT INSTRUCTIONS
1. See back in 2 weeks. 2. Cut the Celebrex down to once per day and try to wean off. 3. Put the arthritis Tylenol on a schedule every morning and every evening. 4. Take the water pill and potassium pill daily x 2 weeks and then see back in office for re evaluation. Personalized Preventive Plan for Keshawn Meraz - 5/11/2021  Medicare offers a range of preventive health benefits. Some of the tests and screenings are paid in full while other may be subject to a deductible, co-insurance, and/or copay. Some of these benefits include a comprehensive review of your medical history including lifestyle, illnesses that may run in your family, and various assessments and screenings as appropriate. After reviewing your medical record and screening and assessments performed today your provider may have ordered immunizations, labs, imaging, and/or referrals for you. A list of these orders (if applicable) as well as your Preventive Care list are included within your After Visit Summary for your review. Other Preventive Recommendations:    · A preventive eye exam performed by an eye specialist is recommended every 1-2 years to screen for glaucoma; cataracts, macular degeneration, and other eye disorders. · A preventive dental visit is recommended every 6 months. · Try to get at least 150 minutes of exercise per week or 10,000 steps per day on a pedometer . · Order or download the FREE \"Exercise & Physical Activity: Your Everyday Guide\" from [employer]. Call 5-609.232.6830 or search [employer] online. · You need 8992-4043 mg of calcium and 5113-5661 IU of vitamin D per day. It is possible to meet your calcium requirement with diet alone, but a vitamin D supplement is usually necessary to meet this goal.  · When exposed to the sun, use a sunscreen that protects against both UVA and UVB radiation with an SPF of 30 or greater.  Reapply every 2 to 3 hours or after cane, crutch, walker, or another device if you need help to get around. These can help rest your joints. You also can use other things to make life easier, such as a higher toilet seat and padded handles on kitchen utensils. Do not sit in low chairs. They can make it hard to get up. Put heat or cold on your sore joints as needed. Use whichever helps you most. You can also switch between hot and cold packs. Apply heat 2 or 3 times a day for 20 to 30 minutesusing a heating pad, hot shower, or hot packto relieve pain and stiffness. But don't use heat on a swollen joint. Put ice or a cold pack on your sore joint for 10 to 20 minutes at a time. Put a thin cloth between the ice and your skin. When should you call for help? Call your doctor now or seek immediate medical care if:    You have sudden swelling, warmth, or pain in any joint.     You have joint pain and a fever or rash.     You have such bad pain that you cannot use a joint. Watch closely for changes in your health, and be sure to contact your doctor if:    You have mild joint symptoms that continue even with more than 6 weeks of care at home.     You have stomach pain or other problems with your medicine. Where can you learn more? Go to [email or web address]://[email or web address] org and sign in to your FiTeq account. Enter L054 in the [facility] box to learn more about \"Arthritis: Care Instructions. \"     If you do not have an account, please click on the \"Sign Up Now\" link. Current as of: August 5, 2020               Content Version: 12.8  © 2006-2021 [employer]. Care instructions adapted under license by [facility] ([facility]). If you have questions about a medical condition or this instruction, always ask your healthcare professional. [address] any warranty or liability for your use of this information.          Patient Education        Bladder Training: Care Instructions  Your Care Instructions urinate, do it. Apply your schedule to waking hours only. Kegel exercises  These tighten and strengthen pelvic muscles, which can help you control the flow of urine. To do Kegel exercises:  Squeeze the same muscles you would use to stop your urine. Your belly and thighs should not move. Hold the squeeze for 3 seconds, and then relax for 3 seconds. Start with 3 seconds. Then add 1 second each week until you are able to squeeze for 10 seconds. Repeat the exercise 10 to 15 times a session. Do three or more sessions a day. When should you call for help? Watch closely for changes in your health, and be sure to contact your doctor if:    Your incontinence is getting worse.     You do not get better as expected. Where can you learn more? Go to [email or web address] org and sign in to your [email or web address] account. Enter O493 in the [employer] box to learn more about \"Bladder Training: Care Instructions. \"     If you do not have an account, please click on the \"Sign Up Now\" link. Current as of: June 29, 2020               Content Version: 12.8  © 2006-2021 [employer]. Care instructions adapted under license by [facility] ([facility]). If you have questions about a medical condition or this instruction, always ask your healthcare professional. [address] any warranty or liability for your use of this information. Patient Education        Kegel Exercises: Care Instructions  Overview     Kegel exercises strengthen muscles around the bladder. These muscles control the flow of urine. Kegel exercises are sometime called \"pelvic floor\" exercises. They can help prevent urine leakage and keep the pelvic organs in place. Kegel exercises can strengthen pelvic muscles that have been weakened by age, pregnancy, childbirth, and surgery. They may help prevent or treat urine leakage. You do Kegel exercises by tightening the muscles you use when you urinate.  You will likely need to do these exercises for several weeks to get better. Follow-up care is a key part of your treatment and safety. Be sure to make and go to all appointments, and call your doctor if you are having problems. It's also a good idea to know your test results and keep a list of the medicines you take. How can you care for yourself at home? Do Kegel exercises. Find the muscles you need to strengthen. To do this, tighten the muscles that stop your urine while you are going to the bathroom. These are the same muscles you squeeze during Kegel exercises. Squeeze the muscles as hard as you can. Your belly and thighs should not move. Hold the squeeze for 3 seconds. Then relax for 3 seconds. Start with 3 seconds, and then add 1 second each week until you are able to squeeze for 10 seconds. Repeat the exercise 10 to 15 times for each session. Do three or more sessions each day. You can check to see if you are using the right muscles. Tighten the muscles that help you stop passing gas or keep you from urinating. Make sure you aren't using your stomach, leg, or buttock muscles. Place a finger in your vagina and squeeze around it. You are doing them right when you feel pressure around your finger. Your doctor may also suggest that you put special weights in your vagina while you do the exercises. Check with your doctor if you don't notice a difference after trying these exercises for several weeks. Your doctor may suggest getting help from a physical therapist or recommend other treatment. Where can you learn more? Go to https://Cor[employer]or[employer]y.[employer]api. org and sign in to your [employer] account. Enter R882 in the [employer] box to learn more about \"Kegel Exercises: Care Instructions. \"     If you do not have an account, please click on the \"Sign Up Now\" link. Current as of: June 29, 2020               Content Version: 12.8  © 5676-2823 Healthwise[employer] Incorporated.    Care instructions adapted under

## 2021-05-11 NOTE — TELEPHONE ENCOUNTER
I called and spoke to the patient's emergency contact, Duane Carrington. I let her know Laura's response regarding her chest x ray. She voiced understanding and she also repeated it to the patient. The patient agrees to have the CT done. Cindy asked if her insurance will pay for it and I her that it will be ran through insurance and [facility] and scheduling will be calling them to schedule the CT. She voiced understanding.

## 2021-05-11 NOTE — PROGRESS NOTES
tablet; Take 0.5 tablets by mouth daily, Disp-45 tablet, R-0Normal  -     [facility] Referral to ACP Clinical Specialist  4. Counseling regarding advanced directives  -     [facility] Referral to [facility] Clinical Specialist  5. Localized osteoarthritis of right knee  -     acetaminophen (TYLENOL) 500 MG tablet; Take 2 tablets by mouth 3 times daily as needed for Pain, Disp-180 tablet, R-0Normal  6. Stress incontinence  -     oxybutynin (DITROPAN) 5 MG tablet; Take 1 tablet by mouth 3 times daily, Disp-90 tablet, R-0Normal      Return for Medicare Annual Wellness Visit in 1 year. SUBJECTIVE/OBJECTIVE:  HPI   Here for annual physical and awv.  Knee replacement in November. Has 100 function of the knee   Good range of motion   Overall arthritis pain of her hands, left knee, feet, back.  No recent falls   Continues to smoke   Appetite has been good   Pt and dtr report more shortness of breath than in normal.    Moist cough, some phlegm. Not discolored. No fever   No nausea, vomiting, diarrhea.  + urinary stress and urge incontinence.  Wearing depends.  No skin breakdown.    Review of Systems   Constitutional: Positive for fatigue. Negative for activity change, appetite change, fever and unexpected weight change. HENT: Negative. Eyes: Negative for photophobia, pain, redness and itching. Respiratory: Positive for cough and shortness of breath. Negative for chest tightness and wheezing. Cardiovascular: Negative for chest pain, palpitations and leg swelling. Gastrointestinal: Negative for abdominal distention, abdominal pain, constipation, diarrhea and nausea. Endocrine: Negative for cold intolerance and heat intolerance. Genitourinary: Positive for urgency. Negative for decreased urine volume, difficulty urinating, dysuria, frequency, hematuria and vaginal discharge. Musculoskeletal: Positive for arthralgias, back pain, gait problem and myalgias. Negative for neck pain. Skin: Negative. Allergic/Immunologic: Negative. Neurological: Negative for dizziness, weakness, light-headedness and headaches. Hematological: Negative for adenopathy. Does not bruise/bleed easily. Psychiatric/Behavioral: Negative. Physical Exam  Vitals signs and nursing note reviewed. Constitutional:       Appearance: Normal appearance. She is normal weight. She is not ill-appearing. HENT:      Head: Normocephalic and atraumatic. Right Ear: Tympanic membrane, ear canal and external ear normal.      Left Ear: Tympanic membrane, ear canal and external ear normal.      Nose: Nose normal.      Mouth/Throat:      Mouth: Mucous membranes are moist.      Pharynx: No posterior oropharyngeal erythema. Eyes:      General: No scleral icterus. Conjunctiva/sclera: Conjunctivae normal.   Neck:      Musculoskeletal: Normal range of motion and neck supple. No muscular tenderness. Vascular: No carotid bruit. Cardiovascular:      Rate and Rhythm: Normal rate and regular rhythm. Pulses: Normal pulses. Heart sounds: Normal heart sounds. Pulmonary:      Breath sounds: Rales ( bilateral lower lobes) present. Abdominal:      General: Abdomen is flat. Bowel sounds are normal.      Palpations: Abdomen is soft. Musculoskeletal: Normal range of motion. General: No swelling, deformity or signs of injury. Right lower leg: No edema. Left lower leg: No edema. Lymphadenopathy:      Cervical: No cervical adenopathy. Skin:     General: Skin is warm and dry. Capillary Refill: Capillary refill takes 2 to 3 seconds. Coloration: Skin is not pale. Findings: No bruising, erythema, lesion or rash. Neurological:      General: No focal deficit present. Mental Status: She is alert and oriented to person, place, and time. Mental status is at baseline. Motor: No weakness.       Coordination: Coordination normal.      Gait: Gait normal.      Deep Tendon Reflexes: Reflexes normal.   Psychiatric:         Mood and Affect: Mood normal.         Behavior: Behavior normal.         Thought Content: Thought content normal.         Judgment: Judgment normal.                 An electronic signature was used to authenticate this note. --EMORY Bernal CNP       Medicare Annual Wellness Visit  Name: Krista Santana Date: 2021   MRN: [id number] Sex: Female   Age: 56 y.o. Ethnicity: Non-/Non    : 1960 Race: Dillon Pavon is here for Medicare AWV and Medication Refill    Screenings for behavioral, psychosocial and functional/safety risks, and cognitive dysfunction are all negative except as indicated below. These results, as well as other patient data from the [address] form, are documented in Flowsheets linked to this Encounter. Allergies   Allergen Reactions    Tramadol          Prior to Visit Medications    Medication Sig Taking?  Authorizing Provider   levothyroxine (SYNTHROID) 25 MCG tablet Take 0.5 tablets by mouth daily Yes EMORY Bernal CNP   acetaminophen (TYLENOL) 500 MG tablet Take 2 tablets by mouth 3 times daily as needed for Pain Yes EMORY Bernal CNP   furosemide (LASIX) 20 MG tablet Take 1 tablet by mouth daily Yes EMORY Bernal CNP   potassium chloride (KLOR-CON M) 10 MEQ extended release tablet Take 1 tablet by mouth 2 times daily Yes EMORY Bernal CNP   oxybutynin (DITROPAN) 5 MG tablet Take 1 tablet by mouth 3 times daily Yes EMORY Bernal CNP   levothyroxine (SYNTHROID) 100 MCG tablet Take 1 tablet by mouth daily Yes EMORY Bernal CNP   atorvastatin (LIPITOR) 10 MG tablet Take 1 tablet by mouth daily Yes EMORY Bernal CNP   omeprazole (PRILOSEC) 20 MG delayed release capsule Take 1 capsule by mouth Daily Yes EMORY Bernal CNP   calcium carbonate (TUMS) 500 MG chewable tablet Take 1 tablet by mouth as needed for Heartburn Yes Historical Provider, MD         Past Medical History:   Diagnosis Date    Anemia     Arthritis     Kidney stone        Past Surgical History:   Procedure Laterality Date    BUNIONECTOMY Bilateral     FINGER AMPUTATION Left          Family History   Problem Relation Age of Onset    Breast Cancer Mother     Hypertension Father     Stroke Father     Other Father         Gout    Brain Cancer Sister        CareTeam (Including outside providers/suppliers regularly involved in providing care):   Patient Care Team:  EMORY Figueroa CNP as PCP - General (Family Nurse Practitioner)  EMORY Figueroa CNP as PCP - [facility] Empaneled Provider    Wt Readings from Last 3 Encounters:   05/11/21 175 lb (79.4 kg)   11/02/20 181 lb 6.4 oz (82.3 kg)   05/11/20 170 lb (77.1 kg)     Vitals:    05/11/21 0832 05/11/21 0906   BP: (!) 180/100 (!) 164/92   Site: Left Upper Arm Right Upper Arm   Position: Sitting    Cuff Size: Medium Adult    Pulse: 97    Temp: 97.7 °F (36.5 °C)    TempSrc: Temporal    SpO2: 99%    Weight: 175 lb (79.4 kg)    Height: 5' 6.1\" (1.679 m)      Body mass index is 28.16 kg/m². Based upon direct observation of the patient, evaluation of cognition reveals recent and remote memory intact. Patient's complete Health Risk Assessment and screening values have been reviewed and are found in Flowsheets. The following problems were reviewed today and where indicated follow up appointments were made and/or referrals ordered.     Positive Risk Factor Screenings with Interventions:         Substance History:  Social History     Tobacco History     Smoking Status  Current Every Day Smoker Smoking Frequency  1 pack/day Smoking Tobacco Type  Cigarettes    Smokeless Tobacco Use  Never Used          Alcohol History     Alcohol Use Status  Not Asked          Drug Use     Drug Use Status  Not Asked          Sexual any trouble with your hearing that hasn't been managed with hearing aids?: No  Do you have difficulty driving, watching TV, or doing any of your daily activities because of your eyesight?: No  Have you had an eye exam within the past year?: (!) No  Hearing/Vision Interventions:  · Vision concerns:  patient encouraged to make appointment with his/her eye specialist     ADL:  ADLs  In the past 7 days, did you need help from others to perform any of the following everyday activities? Eating, dressing, grooming, bathing, toileting, or walking/balance?: None  In the past 7 days, did you need help from others to take care of any of the following? Laundry, housekeeping, banking/finances, shopping, telephone use, food preparation, transportation, or taking medications?: (!) Shopping  ADL Interventions:  · Patient declines any further evaluation/treatment for this issue    Personalized Preventive Plan   Current Health Maintenance Status  Immunization History   Administered Date(s) Administered    Tdap (Boostrix, Adacel) 09/21/2019        Health Maintenance   Topic Date Due    Pneumococcal 0-64 years Vaccine (1 of 3 - PCV13) Never done    COVID-19 Vaccine (1) Never done    Cervical cancer screen  Never done    Breast cancer screen  Never done    Shingles Vaccine (1 of 2) Never done    Colon cancer screen colonoscopy  Never done    Annual Wellness Visit (AWV)  08/14/2021    Flu vaccine (Season Ended) 09/01/2021    Lipid screen  05/03/2022    Potassium monitoring  05/03/2022    Creatinine monitoring  05/03/2022    DTaP/Tdap/Td vaccine (2 - Td) 09/21/2029    Hepatitis C screen  Completed    HIV screen  Completed    Hepatitis A vaccine  Aged Out    Hepatitis B vaccine  Aged Out    Hib vaccine  Aged Out    Meningococcal (ACWY) vaccine  Aged Out     Recommendations for [place] Due: see orders and patient instructions/AVS.  .   Recommended screening schedule for the next 5-10 years is provided to the patient in written form: see Patient Virgil Coronado was seen today for medicare awv and medication refill. Diagnoses and all orders for this visit:    Routine general medical examination at a [facility] care facility  [facility] Referral to [facility] Clinical Specialist    Bilateral rales  -     XR CHEST STANDARD (2 VW); Future  -     furosemide (LASIX) 20 MG tablet; Take 1 tablet by mouth daily  -     potassium chloride (KLOR-CON M) 10 MEQ extended release tablet; Take 1 tablet by mouth 2 times daily  -     [facility] Referral to [facility] Clinical Specialist    Hypothyroidism due to acquired atrophy of thyroid  -     levothyroxine (SYNTHROID) 25 MCG tablet; Take 0.5 tablets by mouth daily  -     [facility] Referral to [address] [place] [address] Specialist    Counseling regarding advanced directives  -     [facility] Referral to [facility] Clinical Specialist    Localized osteoarthritis of right knee  -     acetaminophen (TYLENOL) 500 MG tablet; Take 2 tablets by mouth 3 times daily as needed for Pain    Stress incontinence  -     oxybutynin (DITROPAN) 5 MG tablet;  Take 1 tablet by mouth 3 times daily

## 2021-05-11 NOTE — TELEPHONE ENCOUNTER
----- Message from EMORY Mederos CNP sent at 5/11/2021 11:33 AM EDT -----  Carvel Mili of chest shows a mass of undetermined significance in the right lung base. Would like to complete a CT scan of the chest to determine what it might be. Also shows chronic changes of the lung tissues. The CT will also help define this. This could be the cause of her increase in shortness of breath. No Pneumonia present. CT has been ordered.

## 2021-05-19 ENCOUNTER — CLINICAL DOCUMENTATION (OUTPATIENT)
Dept: SPIRITUAL SERVICES | Facility: CLINIC | Age: 61
End: 2021-05-19

## 2021-05-19 NOTE — ACP (ADVANCE CARE PLANNING)
Advance Care Planning    Ambulatory ACP Specialist Patient Outreach    Date:  5/19/2021  ACP Specialist:  Mahamed Heredia    Outreach call to patient in follow-up to ACP Specialist referral from: Lois Hobbs CNP  [x] PCP  [] Provider   [] Ambulatory Care Management [] Other for Reason:    [x] Continued Conversation for ACP decision making / Goals of Care  [] Code Status Discussion  [x] Completion of Adv Directive  [] Completion of Portable DNR order  [] Other (Specify)    Date Referral Received:5/11/2021    Today's Outreach:  [x] First   [] Second  [] Third                               Third outreach made by []  phone  [] email []   [email or web address]     Intervention:  [] Spoke with Patient  [] Left VM requesting return call  - People Pattern mailbox full / No message could be left    Outcome:  made an initial attempt to contact Mahendra via telephone call to offer support, if desired, for the completion of Advance Directives documents as part of an [address] conversation. No answer. Voicemail mailbox was full. No message could be left.  will follow-up. Next Step:   [] ACP scheduled conversation  [x] Outreach again in one week               [] Email / Mail ACP Info Sheets  [] Email / Mail Advance Directive            [] Close Referral. Routing closure to referring provider/staff and to ACP Specialist .      Thank you for this referral.

## 2021-05-24 ENCOUNTER — CLINICAL DOCUMENTATION (OUTPATIENT)
Dept: SPIRITUAL SERVICES | Facility: CLINIC | Age: 61
End: 2021-05-24

## 2021-05-25 ENCOUNTER — OFFICE VISIT (OUTPATIENT)
Dept: FAMILY MEDICINE CLINIC | Age: 61
End: 2021-05-25
Payer: MEDICARE

## 2021-05-25 VITALS
SYSTOLIC BLOOD PRESSURE: 142 MMHG | TEMPERATURE: 97.7 F | OXYGEN SATURATION: 99 % | BODY MASS INDEX: 27.07 KG/M2 | WEIGHT: 168.2 LBS | HEART RATE: 96 BPM | DIASTOLIC BLOOD PRESSURE: 88 MMHG

## 2021-05-25 DIAGNOSIS — R60.0 LOCALIZED EDEMA: ICD-10-CM

## 2021-05-25 DIAGNOSIS — Z91.89 AT RISK FOR FLUID VOLUME OVERLOAD: Primary | ICD-10-CM

## 2021-05-25 PROCEDURE — 99213 OFFICE O/P EST LOW 20 MIN: CPT | Performed by: NURSE PRACTITIONER

## 2021-05-25 PROCEDURE — G8427 DOCREV CUR MEDS BY ELIG CLIN: HCPCS | Performed by: NURSE PRACTITIONER

## 2021-05-25 PROCEDURE — 3017F COLORECTAL CA SCREEN DOC REV: CPT | Performed by: NURSE PRACTITIONER

## 2021-05-25 PROCEDURE — 4004F PT TOBACCO SCREEN RCVD TLK: CPT | Performed by: NURSE PRACTITIONER

## 2021-05-25 PROCEDURE — G8417 CALC BMI ABV UP PARAM F/U: HCPCS | Performed by: NURSE PRACTITIONER

## 2021-05-25 ASSESSMENT — ENCOUNTER SYMPTOMS
ABDOMINAL DISTENTION: 0
BACK PAIN: 0
EYE REDNESS: 0
CHEST TIGHTNESS: 0
SHORTNESS OF BREATH: 0
WHEEZING: 0
EYE PAIN: 0
NAUSEA: 0
DIARRHEA: 0
ABDOMINAL PAIN: 0
EYE ITCHING: 0
CONSTIPATION: 0
ALLERGIC/IMMUNOLOGIC NEGATIVE: 1
PHOTOPHOBIA: 0
COUGH: 0

## 2021-05-25 NOTE — PROGRESS NOTES
[address]  Dept: [address] [facility] (:  1960) is a 56 y.o. female,Established patient, here for evaluation of the following chief complaint(s):  2 Week Follow-Up      ASSESSMENT/PLAN:  I have reviewed the patient's medical history in detail and updated the computerized patient record. HPI/ROS per the patient and caregiver. Overall non toxic in appearance. Answers questions appropriately. Conditions discussed and addressed this visit include:     Return to using lasix only as needed. Continue to monitor weight, report weight gain of 2 lbs in 24 hours or more to office. Continue to wean celebrex. Use arthritis strength tylenol for pain. Pt agreeable to plan of care. 1. At risk for fluid volume overload  2. Localized edema      Return in about 3 months (around 2021) for Disease management, Routine follow up. SUBJECTIVE/OBJECTIVE:  HPI   Follow up visit for rales that developed post surgery and with the use of the celebrex   Had significant leg edema and rales   Took the water pill per orders, and decreased the celebrex to every other day   Weight down and pain is controlle. d      Review of Systems   Constitutional: Negative for activity change, appetite change, fatigue, fever and unexpected weight change. HENT: Negative. Eyes: Negative for photophobia, pain, redness and itching. Respiratory: Negative for cough, chest tightness, shortness of breath and wheezing. Cardiovascular: Negative for chest pain, palpitations and leg swelling. Gastrointestinal: Negative for abdominal distention, abdominal pain, constipation, diarrhea and nausea. Endocrine: Negative for cold intolerance and heat intolerance. Genitourinary: Negative for decreased urine volume, difficulty urinating, dysuria, frequency, hematuria, urgency and vaginal discharge. Musculoskeletal: Positive for arthralgias, gait problem and joint swelling. Negative for back pain, myalgias and neck pain. Skin: Negative. Allergic/Immunologic: Negative. Neurological: Negative for dizziness, weakness, light-headedness and headaches. Hematological: Negative for adenopathy. Does not bruise/bleed easily. Psychiatric/Behavioral: Negative. Physical Exam  Vitals and nursing note reviewed. Constitutional:       Appearance: Normal appearance. She is normal weight. She is not ill-appearing. HENT:      Head: Normocephalic and atraumatic. Right Ear: Tympanic membrane, ear canal and external ear normal.      Left Ear: Tympanic membrane, ear canal and external ear normal.      Nose: Nose normal.      Mouth/Throat:      Mouth: Mucous membranes are moist.      Pharynx: No posterior oropharyngeal erythema. Eyes:      General: No scleral icterus. Conjunctiva/sclera: Conjunctivae normal.   Neck:      Vascular: No carotid bruit. Cardiovascular:      Rate and Rhythm: Normal rate and regular rhythm. Pulses: Normal pulses. Heart sounds: Normal heart sounds. Pulmonary:      Breath sounds: No rhonchi or rales. Abdominal:      General: Abdomen is flat. Bowel sounds are normal.      Palpations: Abdomen is soft. Musculoskeletal:         General: No swelling, deformity or signs of injury. Normal range of motion. Cervical back: Normal range of motion and neck supple. No muscular tenderness. Right lower leg: No edema. Left lower leg: No edema. Lymphadenopathy:      Cervical: No cervical adenopathy. Skin:     General: Skin is warm and dry. Capillary Refill: Capillary refill takes 2 to 3 seconds. Coloration: Skin is not pale. Findings: No bruising, erythema, lesion or rash. Neurological:      General: No focal deficit present. Mental Status: She is alert and oriented to person, place, and time. Mental status is at baseline. Motor: No weakness.       Coordination: Coordination normal.      Gait: Gait normal. Deep Tendon Reflexes: Reflexes normal.   Psychiatric:         Mood and Affect: Mood normal.         Behavior: Behavior normal.         Thought Content: Thought content normal.         Judgment: Judgment normal.                 An electronic signature was used to authenticate this note.     --EMORY Chang - CNP

## 2021-06-14 ENCOUNTER — CLINICAL DOCUMENTATION (OUTPATIENT)
Dept: SPIRITUAL SERVICES | Facility: CLINIC | Age: 61
End: 2021-06-14

## 2021-06-14 NOTE — ACP (ADVANCE CARE PLANNING)
Advance Care Planning    Ambulatory ACP Specialist Patient Outreach    Date:  6/14/2021  ACP Specialist:  Micah Borrero    Outreach call to patient in follow-up to ACP Specialist referral from: Justin Escobedo CNP; June Board - ACP Manager  [x] PCP  [] Provider   [] Ambulatory Care Management [] Other for Reason:    [x] Continued Conversation for ACP decision making / Goals of Care  [] Code Status Discussion  [x] Completion of Adv Directive  [] Completion of Portable DNR order  [] Other (Specify)    Date Referral Received: 5/11/2021    Today's Outreach:  [] First   [] Second  [x] Third                               Third outreach made by [x]  phone  [] email []   [facility]     Intervention:  [x] Spoke with Patient  [] Left VM requesting return call  - Daughter - Cindy    Outcome:   made a 3rd initial attempt to contact Filippo Abraham via telephone call to offer support, as requested, for the completion of Advance Directives documents as part of an [address] conversation. She had expressed interest in the completion of documents with her daughter present. *  spoke with daughter, Era Johns, and explained documents for clarity and greater understand, as well as information needed for completion for her mother. * An appointment is scheduled following their vacation time on 7/19 at [facility]. Next Step:   [x] ACP scheduled conversation  [] Outreach again in one week               [] Email / Mail ACP Info Sheets  [] Email / Mail Advance Directive            [] Close Referral. Routing closure to referring provider/staff and to ACP Specialist .      Thank you for this referral.

## 2021-07-16 DIAGNOSIS — Z91.89 AT RISK FOR FLUID VOLUME OVERLOAD: Primary | ICD-10-CM

## 2021-07-16 DIAGNOSIS — M17.11 PRIMARY OSTEOARTHRITIS OF RIGHT KNEE: ICD-10-CM

## 2021-07-16 RX ORDER — CELECOXIB 100 MG/1
100 CAPSULE ORAL 2 TIMES DAILY
Qty: 60 CAPSULE | Refills: 0 | Status: SHIPPED | OUTPATIENT
Start: 2021-07-16 | End: 2022-09-19 | Stop reason: ALTCHOICE

## 2021-07-16 NOTE — PROGRESS NOTES
Verbal per BODØ, she gave me a BMP order and said to have the patient get it done now for a recheck. I called the patient's daughter, Gosia Cleveland and received voicemail. I left a detailed message on her voicemail letting her know Laura's response and that the order will be at [facility] inside the [facility] and if she has any questions she can give the office a call back. Lab order was put on Lakisha's desk at [facility].

## 2021-07-19 ENCOUNTER — CLINICAL DOCUMENTATION (OUTPATIENT)
Dept: SPIRITUAL SERVICES | Facility: CLINIC | Age: 61
End: 2021-07-19

## 2021-07-19 NOTE — ACP (ADVANCE CARE PLANNING)
Advance Care Planning   Advance Care Planning Note  Ambulatory Spiritual Care Services    Date:  7/19/2021    Received request from [employer]. Consultation conversation participants:   Patient who understands ACP conversation  Patient's child(prashanth)     Goals of ACP Conversation:  Discuss advance care planning documents  Facilitate a discussion related to patient's goals of care as they align with the patient's values and beliefs. Health Care Decision Makers:      Primary Decision Maker (Active): Valerie Zapata - [phone]    Primary Decision Maker: Mik Bravo  [phone]    Secondary Decision Maker: Gabriel Burden - [phone]    Supplemental (Other) Decision Maker: Valerie Zapata - [phone]     Today we:  Updated [address] (Patient Wishes)  Currently on file:   Healthcare Power of /Advance Directive Appointment of [address]  Living Will/Advance Directive    Assessment:    met with Abdias Pfeiffer, along with her daughter Destin Pinedo, to provide support for the completion of Advance Directives documents as part of an [address] conversation. * She was alert and oriented with decision-making capacity to express her wishes at this time. Interventions:  Provided education on documents for clarity and greater understanding. Assisted in the completion of documents according to patient's wishes at this time. Encouraged ongoing ACP conversation with future decision makers and loved ones. Returned original document(s) to patient, as well as copies for distribution to appointed agents. Outcomes:  New advance directive completed. Original advance directive form and copies for agent(s) given to patient. Routed ACP note to attending provider or other IDT member.     Electronically signed by Chaplain Angel on 7/19/2021 at 4:47 PM.

## 2021-08-13 DIAGNOSIS — N39.3 STRESS INCONTINENCE: ICD-10-CM

## 2021-08-13 DIAGNOSIS — E03.4 HYPOTHYROIDISM DUE TO ACQUIRED ATROPHY OF THYROID: ICD-10-CM

## 2021-08-13 DIAGNOSIS — R09.89 BILATERAL RALES: ICD-10-CM

## 2021-08-13 RX ORDER — FUROSEMIDE 20 MG/1
20 TABLET ORAL DAILY
Qty: 30 TABLET | Refills: 1 | Status: SHIPPED | OUTPATIENT
Start: 2021-08-13 | End: 2022-06-10 | Stop reason: SDUPTHER

## 2021-08-13 RX ORDER — LEVOTHYROXINE SODIUM 0.1 MG/1
100 TABLET ORAL DAILY
Qty: 90 TABLET | Refills: 3 | Status: SHIPPED | OUTPATIENT
Start: 2021-08-13 | End: 2022-06-10 | Stop reason: SDUPTHER

## 2021-08-13 RX ORDER — OXYBUTYNIN CHLORIDE 5 MG/1
5 TABLET ORAL 3 TIMES DAILY
Qty: 90 TABLET | Refills: 3 | Status: SHIPPED | OUTPATIENT
Start: 2021-08-13 | End: 2022-06-10 | Stop reason: SDUPTHER

## 2021-11-05 DIAGNOSIS — K21.00 GASTROESOPHAGEAL REFLUX DISEASE WITH ESOPHAGITIS WITHOUT HEMORRHAGE: ICD-10-CM

## 2021-11-05 DIAGNOSIS — E78.2 MIXED HYPERLIPIDEMIA: ICD-10-CM

## 2021-11-05 RX ORDER — ATORVASTATIN CALCIUM 10 MG/1
10 TABLET, FILM COATED ORAL DAILY
Qty: 90 TABLET | Refills: 3 | Status: SHIPPED | OUTPATIENT
Start: 2021-11-05 | End: 2022-06-10 | Stop reason: SDUPTHER

## 2021-11-05 RX ORDER — OMEPRAZOLE 20 MG/1
20 CAPSULE, DELAYED RELEASE ORAL DAILY
Qty: 90 CAPSULE | Refills: 3 | Status: SHIPPED | OUTPATIENT
Start: 2021-11-05 | End: 2022-06-10 | Stop reason: SDUPTHER

## 2022-06-08 ENCOUNTER — OFFICE VISIT (OUTPATIENT)
Dept: FAMILY MEDICINE CLINIC | Age: 62
End: 2022-06-08
Payer: MEDICARE

## 2022-06-08 ENCOUNTER — NURSE ONLY (OUTPATIENT)
Dept: LAB | Age: 62
End: 2022-06-08

## 2022-06-08 VITALS
TEMPERATURE: 97.9 F | SYSTOLIC BLOOD PRESSURE: 150 MMHG | OXYGEN SATURATION: 99 % | HEART RATE: 90 BPM | BODY MASS INDEX: 26.93 KG/M2 | HEIGHT: 66 IN | DIASTOLIC BLOOD PRESSURE: 100 MMHG | WEIGHT: 167.6 LBS

## 2022-06-08 DIAGNOSIS — E03.4 HYPOTHYROIDISM DUE TO ACQUIRED ATROPHY OF THYROID: ICD-10-CM

## 2022-06-08 DIAGNOSIS — J84.9 INTERSTITIAL LUNG DISEASE (HCC): ICD-10-CM

## 2022-06-08 DIAGNOSIS — E78.2 MIXED HYPERLIPIDEMIA: ICD-10-CM

## 2022-06-08 DIAGNOSIS — Z87.891 PERSONAL HISTORY OF NICOTINE DEPENDENCE: ICD-10-CM

## 2022-06-08 DIAGNOSIS — K21.00 GASTROESOPHAGEAL REFLUX DISEASE WITH ESOPHAGITIS WITHOUT HEMORRHAGE: ICD-10-CM

## 2022-06-08 DIAGNOSIS — Z72.0 TOBACCO ABUSE DISORDER: ICD-10-CM

## 2022-06-08 DIAGNOSIS — Z00.00 MEDICARE ANNUAL WELLNESS VISIT, SUBSEQUENT: Primary | ICD-10-CM

## 2022-06-08 DIAGNOSIS — E03.9 ACQUIRED HYPOTHYROIDISM: ICD-10-CM

## 2022-06-08 DIAGNOSIS — N18.4 CHRONIC KIDNEY DISEASE, STAGE 4 (SEVERE) (HCC): ICD-10-CM

## 2022-06-08 DIAGNOSIS — I10 BENIGN ESSENTIAL HTN: ICD-10-CM

## 2022-06-08 LAB
BASOPHILS # BLD: 0.6 %
BASOPHILS ABSOLUTE: 0.1 THOU/MM3 (ref 0–0.1)
EOSINOPHIL # BLD: 1.3 %
EOSINOPHILS ABSOLUTE: 0.1 THOU/MM3 (ref 0–0.4)
ERYTHROCYTE [DISTWIDTH] IN BLOOD BY AUTOMATED COUNT: 14.5 % (ref 11.5–14.5)
ERYTHROCYTE [DISTWIDTH] IN BLOOD BY AUTOMATED COUNT: 52.4 FL (ref 35–45)
HCT VFR BLD CALC: 45.4 % (ref 37–47)
HEMOGLOBIN: 14 GM/DL (ref 12–16)
IMMATURE GRANS (ABS): 0.03 THOU/MM3 (ref 0–0.07)
IMMATURE GRANULOCYTES: 0.3 %
LYMPHOCYTES # BLD: 13.9 %
LYMPHOCYTES ABSOLUTE: 1.3 THOU/MM3 (ref 1–4.8)
MCH RBC QN AUTO: 30.6 PG (ref 26–33)
MCHC RBC AUTO-ENTMCNC: 30.8 GM/DL (ref 32.2–35.5)
MCV RBC AUTO: 99.1 FL (ref 81–99)
MONOCYTES # BLD: 7.3 %
MONOCYTES ABSOLUTE: 0.7 THOU/MM3 (ref 0.4–1.3)
NUCLEATED RED BLOOD CELLS: 0 /100 WBC
PLATELET # BLD: 213 THOU/MM3 (ref 130–400)
PMV BLD AUTO: 11.7 FL (ref 9.4–12.4)
RBC # BLD: 4.58 MILL/MM3 (ref 4.2–5.4)
SEG NEUTROPHILS: 76.6 %
SEGMENTED NEUTROPHILS ABSOLUTE COUNT: 7.4 THOU/MM3 (ref 1.8–7.7)
WBC # BLD: 9.7 THOU/MM3 (ref 4.8–10.8)

## 2022-06-08 PROCEDURE — 99406 BEHAV CHNG SMOKING 3-10 MIN: CPT | Performed by: NURSE PRACTITIONER

## 2022-06-08 PROCEDURE — 3017F COLORECTAL CA SCREEN DOC REV: CPT | Performed by: NURSE PRACTITIONER

## 2022-06-08 PROCEDURE — G0439 PPPS, SUBSEQ VISIT: HCPCS | Performed by: NURSE PRACTITIONER

## 2022-06-08 RX ORDER — LISINOPRIL 5 MG/1
5 TABLET ORAL DAILY
Qty: 90 TABLET | Refills: 1 | Status: SHIPPED | OUTPATIENT
Start: 2022-06-08 | End: 2022-09-19 | Stop reason: SDUPTHER

## 2022-06-08 SDOH — HEALTH STABILITY: PHYSICAL HEALTH: ON AVERAGE, HOW MANY DAYS PER WEEK DO YOU ENGAGE IN MODERATE TO STRENUOUS EXERCISE (LIKE A BRISK WALK)?: 0 DAYS

## 2022-06-08 SDOH — HEALTH STABILITY: PHYSICAL HEALTH: ON AVERAGE, HOW MANY MINUTES DO YOU ENGAGE IN EXERCISE AT THIS LEVEL?: 0 MIN

## 2022-06-08 ASSESSMENT — ENCOUNTER SYMPTOMS
CONSTIPATION: 0
NAUSEA: 0
CHEST TIGHTNESS: 0
EYE ITCHING: 0
BACK PAIN: 0
ABDOMINAL PAIN: 0
COUGH: 0
EYE PAIN: 0
EYE REDNESS: 0
SHORTNESS OF BREATH: 0
ALLERGIC/IMMUNOLOGIC NEGATIVE: 1
DIARRHEA: 0
PHOTOPHOBIA: 0
WHEEZING: 0
ABDOMINAL DISTENTION: 0

## 2022-06-08 ASSESSMENT — PATIENT HEALTH QUESTIONNAIRE - PHQ9
2. FEELING DOWN, DEPRESSED OR HOPELESS: 0
SUM OF ALL RESPONSES TO PHQ QUESTIONS 1-9: 0
SUM OF ALL RESPONSES TO PHQ QUESTIONS 1-9: 0
1. LITTLE INTEREST OR PLEASURE IN DOING THINGS: 0
SUM OF ALL RESPONSES TO PHQ9 QUESTIONS 1 & 2: 0
SUM OF ALL RESPONSES TO PHQ QUESTIONS 1-9: 0
SUM OF ALL RESPONSES TO PHQ QUESTIONS 1-9: 0

## 2022-06-08 ASSESSMENT — LIFESTYLE VARIABLES
HOW MANY STANDARD DRINKS CONTAINING ALCOHOL DO YOU HAVE ON A TYPICAL DAY: 1 OR 2
HOW MANY STANDARD DRINKS CONTAINING ALCOHOL DO YOU HAVE ON A TYPICAL DAY: 1
HOW OFTEN DO YOU HAVE A DRINK CONTAINING ALCOHOL: 1
HOW OFTEN DO YOU HAVE SIX OR MORE DRINKS ON ONE OCCASION: 1
HOW OFTEN DO YOU HAVE A DRINK CONTAINING ALCOHOL: NEVER

## 2022-06-08 NOTE — PROGRESS NOTES
[address]  Dept: [address] [facility] (:  1960) is a 59 y.o. female,Established patient, here for evaluation of the following chief complaint(s):  Medicare AWV and Other (posture)      ASSESSMENT/PLAN:  I have reviewed the patient's medical history in detail and updated the computerized patient record. HPI/ROS per the patient and caregiver. Overall non toxic in appearance. Answers questions appropriately. Conditions discussed and addressed this visit include:     Overall pt doing very well following her knee replacement last year  Her blood pressure remains high evern on recheck. Has been high in the past as well  Pt is agreeable to trying lisinopril   Will obtain BP cuff from her health insurance to monitor her readings. Continue to be active with walking  Tobacco cessation discussed  She can start with the 21 mg patch as she is smoking more than 1 ppd. The patient is advised to quit smoking, begin progressive daily aerobic exercise program, reduce salt in diet and cooking, improve dietary compliance, use calcium 1 gram daily with Vit D, continue current medications, continue current healthy lifestyle patterns and return for routine annual checkups. 1. Medicare annual wellness visit, subsequent  2. Benign essential HTN  3. Gastroesophageal reflux disease with esophagitis without hemorrhage  -     Lipid Panel; Future  -     Comprehensive Metabolic Panel; Future  -     CBC with Auto Differential; Future  -     TSH with Reflex; Future  -     Vitamin D 25 Hydroxy; Future  4. Mixed hyperlipidemia  -     Lipid Panel; Future  -     Comprehensive Metabolic Panel; Future  -     CBC with Auto Differential; Future  -     TSH with Reflex; Future  -     Vitamin D 25 Hydroxy; Future  5. Hypothyroidism due to acquired atrophy of thyroid  -     Lipid Panel; Future  -     Comprehensive Metabolic Panel;  Future  -     CBC with Auto Differential; Future  - TSH with Reflex; Future  -     Vitamin D 25 Hydroxy; Future  6. Interstitial lung disease ([address]  -     Lipid Panel; Future  -     Comprehensive Metabolic Panel; Future  -     CBC with Auto Differential; Future  -     TSH with Reflex; Future  -     Vitamin D 25 Hydroxy; Future  7. Chronic kidney disease, stage 4 (severe) (HCC)  -     Lipid Panel; Future  -     Comprehensive Metabolic Panel; Future  -     CBC with Auto Differential; Future  -     TSH with Reflex; Future  -     Vitamin D 25 Hydroxy; Future  8. Acquired hypothyroidism  -     Lipid Panel; Future  -     Comprehensive Metabolic Panel; Future  -     CBC with Auto Differential; Future  -     TSH with Reflex; Future  -     Vitamin D 25 Hydroxy; Future  9. Tobacco abuse disorder  10. Personal history of nicotine dependence       Return in 3 months (on 9/8/2022) for Medicare Annual Wellness Visit in 1 year, Results review, Routine follow up. SUBJECTIVE/OBJECTIVE:  HPI   Pt here for annual exam   Overall doing well   Walking several times a week from her apt to [employer].  No chest pain   No sob   No edema   Right knee is well healed and stable   She is not using cane or assistive devices.  She has noted her posture is poor. She has been noted as the day goes on she leans forward more and more   No falls   Continues to smoke   Considering using patches which she can get through her insurance company for tob cessation.    Review of Systems   Constitutional: Negative for activity change, appetite change, fatigue, fever and unexpected weight change. HENT: Negative. Eyes: Negative for photophobia, pain, redness and itching. Respiratory: Negative for cough, chest tightness, shortness of breath and wheezing. Cardiovascular: Negative for chest pain, palpitations and leg swelling. Gastrointestinal: Negative for abdominal distention, abdominal pain, constipation, diarrhea and nausea.    Endocrine: Negative for cold intolerance and heat intolerance. Genitourinary: Negative for decreased urine volume, difficulty urinating, dysuria, frequency, hematuria, urgency and vaginal discharge. Musculoskeletal: Positive for arthralgias, gait problem and joint swelling. Negative for back pain, myalgias and neck pain. Skin: Negative. Allergic/Immunologic: Negative. Neurological: Negative for dizziness, weakness, light-headedness and headaches. Hematological: Negative for adenopathy. Does not bruise/bleed easily. Psychiatric/Behavioral: Negative. Physical Exam  Vitals and nursing note reviewed. Constitutional:       Appearance: Normal appearance. She is normal weight. She is not ill-appearing. HENT:      Head: Normocephalic and atraumatic. Right Ear: Tympanic membrane, ear canal and external ear normal.      Left Ear: Tympanic membrane, ear canal and external ear normal.      Nose: Nose normal.      Mouth/Throat:      Mouth: Mucous membranes are moist.      Pharynx: No posterior oropharyngeal erythema. Eyes:      General: No scleral icterus. Conjunctiva/sclera: Conjunctivae normal.   Neck:      Vascular: No carotid bruit. Cardiovascular:      Rate and Rhythm: Normal rate and regular rhythm. Pulses: Normal pulses. Heart sounds: Normal heart sounds. Pulmonary:      Effort: Pulmonary effort is normal.      Breath sounds: Normal breath sounds. No rales. Abdominal:      General: Abdomen is flat. Bowel sounds are normal.      Palpations: Abdomen is soft. Musculoskeletal:         General: No swelling, deformity or signs of injury. Normal range of motion. Cervical back: Normal range of motion and neck supple. No muscular tenderness. Right lower leg: No edema. Left lower leg: No edema. Lymphadenopathy:      Cervical: No cervical adenopathy. Skin:     General: Skin is warm and dry. Capillary Refill: Capillary refill takes 2 to 3 seconds. Coloration: Skin is not pale.       Findings: No bruising, erythema, lesion or rash. Neurological:      General: No focal deficit present. Mental Status: She is alert and oriented to person, place, and time. Mental status is at baseline. Motor: No weakness. Coordination: Coordination normal.      Gait: Gait normal.      Deep Tendon Reflexes: Reflexes normal.   Psychiatric:         Mood and Affect: Mood normal.         Behavior: Behavior normal.         Thought Content: Thought content normal.         Judgment: Judgment normal.                 An electronic signature was used to authenticate this note. --Shaune Cogan, APRN - CNP   Medicare Annual Wellness Visit    Oumar Toro is here for Medicare AWV and Other (posture)    Assessment & Plan   Medicare annual wellness visit, subsequent  Benign essential HTN  Gastroesophageal reflux disease with esophagitis without hemorrhage  -     Lipid Panel; Future  -     Comprehensive Metabolic Panel; Future  -     CBC with Auto Differential; Future  -     TSH with Reflex; Future  -     Vitamin D 25 Hydroxy; Future  Mixed hyperlipidemia  -     Lipid Panel; Future  -     Comprehensive Metabolic Panel; Future  -     CBC with Auto Differential; Future  -     TSH with Reflex; Future  -     Vitamin D 25 Hydroxy; Future  Hypothyroidism due to acquired atrophy of thyroid  -     Lipid Panel; Future  -     Comprehensive Metabolic Panel; Future  -     CBC with Auto Differential; Future  -     TSH with Reflex; Future  -     Vitamin D 25 Hydroxy; Future  Interstitial lung disease ([facility] [address]  -     Lipid Panel; Future  -     Comprehensive Metabolic Panel; Future  -     CBC with Auto Differential; Future  -     TSH with Reflex; Future  -     Vitamin D 25 Hydroxy; Future  Chronic kidney disease, stage 4 (severe) (HCC)  -     Lipid Panel; Future  -     Comprehensive Metabolic Panel; Future  -     CBC with Auto Differential; Future  -     TSH with Reflex; Future  -     Vitamin D 25 Hydroxy;  Future  Acquired hypothyroidism  -     Lipid Panel; Future  -     Comprehensive Metabolic Panel; Future  -     CBC with Auto Differential; Future  -     TSH with Reflex; Future  -     Vitamin D 25 Hydroxy; Future  Tobacco abuse disorder  Personal history of nicotine dependence       Recommendations for Preventive Services Due: see orders and patient instructions/AVS.  Recommended screening schedule for the next 5-10 years is provided to the patient in written form: see Patient Instructions/AVS.     Return in 3 months (on 9/8/2022) for Medicare Annual Wellness Visit in 1 year, Results review, Routine follow up. Subjective   See above for acute and chronic disease states addressed. Patient's complete Health Risk Assessment and screening values have been reviewed and are found in Flowsheets. The following problems were reviewed today and where indicated follow up appointments were made and/or referrals ordered.     Positive Risk Factor Screenings with Interventions:         Tobacco Use:     Tobacco Use: High Risk    Smoking Tobacco Use: Current Every Day Smoker    Smokeless Tobacco Use: Never Used     E-Cigarettes/Vaping Use     Questions Responses    E-Cigarette/Vaping Use Never User    Start Date     Passive Exposure     Quit Date     Counseling Given     Comments         Substance Use - Tobacco Interventions:  tobacco cessation tips and resources provided, patient agrees to think about his/her triggers for tobacco use, why he/she should quit, and learn more about the harmful effects of tobacco, patient agrees to tell someone he/she is trying to quit smoking, patient agrees to try the following tobacco cessation strategies:  nicotine replacement: patches, risks and benefits of this medication discussed- patient will call for any significant adverse effects           General Health and ACP:  General  In general, how would you say your health is?: Very Good  In the past 7 days, have you experienced any of the following: New or Increased Pain, New or Increased Fatigue, Loneliness, Social Isolation, Stress or Anger?: (!) Yes  Select all that apply: (!) Anger  Do you get the social and emotional support that you need?: Yes  Do you have a Living Will?: Yes    Advance Directives     Power of  Living Will ACP-Advance Directive ACP-Power of Alonzo Nogueira on 07/21/21 Filed on 07/21/21 Filed [address] [place] Risk Interventions:  · Anger: relaxation techniques discussed    Health Habits/Nutrition:     Physical Activity: Inactive    Days of Exercise per Week: 0 days    Minutes of Exercise per Session: 0 min     Have you lost any weight without trying in the past 3 months?: No  Body mass index: (!) 27.05  Have you seen the dentist within the past year?: N/A - wear dentures    Health Habits/Nutrition Interventions:  · Nutritional issues:  educational materials for healthy, well-balanced diet provided, educational materials to promote weight loss provided     Safety:  Do you have working smoke detectors?: Yes  Do you have any tripping hazards - loose or unsecured carpets or rugs?: (!) Yes  Do you have any tripping hazards - clutter in doorways, halls, or stairs?: (!) Yes  Do you have either shower bars, grab bars, non-slip mats or non-slip surfaces in your shower or bathtub?: Yes  Do all of your stairways have a railing or banister?: Yes  Do you always fasten your seatbelt when you are in a car?: Yes    Safety Interventions:  · Home safety tips provided           Objective   Vitals:    06/08/22 0936 06/08/22 0944 06/08/22 1000   BP: (!) 182/110 (!) 182/102 (!) 150/100   Site: Left Upper Arm Right Upper Arm Left Upper Arm   Position: Sitting Sitting    Cuff Size: Medium Adult Medium Adult    Pulse: 90     Temp: 97.9 °F (36.6 °C)     TempSrc: Temporal     SpO2: 99%     Weight: 167 lb 9.6 oz (76 kg)     Height: 5' 6\" (1.676 m)        Body mass index is 27.05 kg/m².                Allergies   Allergen Reactions    Tramadol      Prior to Visit Medications    Medication Sig Taking?  Authorizing Provider   lisinopril (PRINIVIL;ZESTRIL) 5 MG tablet Take 1 tablet by mouth daily Yes EMORY Arreguin CNP   omeprazole (PRILOSEC) 20 MG delayed release capsule Take 1 capsule by mouth Daily Yes EMORY Arreguin CNP   atorvastatin (LIPITOR) 10 MG tablet Take 1 tablet by mouth daily Yes EMORY Arreguin CNP   oxybutynin (DITROPAN) 5 MG tablet Take 1 tablet by mouth 3 times daily Yes EMORY Arreguin CNP   levothyroxine (SYNTHROID) 100 MCG tablet Take 1 tablet by mouth daily Yes EMORY Arreguin CNP   acetaminophen (TYLENOL) 500 MG tablet Take 2 tablets by mouth 3 times daily as needed for Pain Yes EMORY Arreguin CNP   calcium carbonate (TUMS) 500 MG chewable tablet Take 1 tablet by mouth as needed for Heartburn Yes Historical Provider, MD   furosemide (LASIX) 20 MG tablet Take 1 tablet by mouth daily  Patient not taking: Reported on 6/8/2022  EMORY Arreguin CNP   celecoxib (CELEBREX) 100 MG capsule Take 1 capsule by mouth 2 times daily  Patient not taking: Reported on 6/8/2022  EMORY Arreguin CNP   levothyroxine (SYNTHROID) 25 MCG tablet Take 0.5 tablets by mouth daily  Patient not taking: Reported on 6/8/2022  EMORY Arreguin CNP   potassium chloride (KLOR-CON M) 10 MEQ extended release tablet Take 1 tablet by mouth 2 times daily  Patient not taking: Reported on 6/8/2022  EMORY Arreguin CNP       CareTeam (Including outside providers/suppliers regularly involved in providing care):   Patient Care Team:  EMORY Arreguin CNP as PCP - General (Family Nurse Practitioner)  EMORY Arreguin CNP as PCP - [facility] Empaneled Provider     Reviewed and updated this visit:  Tobacco  Allergies  Meds  Problems  Med Hx  Surg Hx  Soc Hx  Fam Hx

## 2022-06-09 LAB
ALBUMIN SERPL-MCNC: 4.8 G/DL (ref 3.5–5.1)
ALP BLD-CCNC: 87 U/L (ref 38–126)
ALT SERPL-CCNC: 8 U/L (ref 11–66)
ANION GAP SERPL CALCULATED.3IONS-SCNC: 15 MEQ/L (ref 8–16)
AST SERPL-CCNC: 14 U/L (ref 5–40)
BILIRUB SERPL-MCNC: 0.3 MG/DL (ref 0.3–1.2)
BUN BLDV-MCNC: 18 MG/DL (ref 7–22)
CALCIUM SERPL-MCNC: 9.4 MG/DL (ref 8.5–10.5)
CHLORIDE BLD-SCNC: 105 MEQ/L (ref 98–111)
CHOLESTEROL, TOTAL: 176 MG/DL (ref 100–199)
CO2: 22 MEQ/L (ref 23–33)
CREAT SERPL-MCNC: 1.3 MG/DL (ref 0.4–1.2)
GFR SERPL CREATININE-BSD FRML MDRD: 41 ML/MIN/1.73M2
GLUCOSE BLD-MCNC: 79 MG/DL (ref 70–108)
HDLC SERPL-MCNC: 49 MG/DL
LDL CHOLESTEROL CALCULATED: 105 MG/DL
POTASSIUM SERPL-SCNC: 4.5 MEQ/L (ref 3.5–5.2)
SODIUM BLD-SCNC: 142 MEQ/L (ref 135–145)
T4 FREE: 1.33 NG/DL (ref 0.93–1.76)
TOTAL PROTEIN: 6.8 G/DL (ref 6.1–8)
TRIGL SERPL-MCNC: 112 MG/DL (ref 0–199)
TSH SERPL DL<=0.05 MIU/L-ACNC: 12.6 UIU/ML (ref 0.4–4.2)
VITAMIN D 25-HYDROXY: 28 NG/ML (ref 30–100)

## 2022-06-10 ENCOUNTER — TELEPHONE (OUTPATIENT)
Dept: FAMILY MEDICINE CLINIC | Age: 62
End: 2022-06-10

## 2022-06-10 DIAGNOSIS — N39.3 STRESS INCONTINENCE: ICD-10-CM

## 2022-06-10 DIAGNOSIS — K21.00 GASTROESOPHAGEAL REFLUX DISEASE WITH ESOPHAGITIS WITHOUT HEMORRHAGE: ICD-10-CM

## 2022-06-10 DIAGNOSIS — E78.2 MIXED HYPERLIPIDEMIA: ICD-10-CM

## 2022-06-10 DIAGNOSIS — R09.89 BILATERAL RALES: ICD-10-CM

## 2022-06-10 DIAGNOSIS — E03.4 HYPOTHYROIDISM DUE TO ACQUIRED ATROPHY OF THYROID: ICD-10-CM

## 2022-06-10 RX ORDER — LEVOTHYROXINE SODIUM 0.1 MG/1
100 TABLET ORAL DAILY
Qty: 90 TABLET | Refills: 3 | Status: SHIPPED | OUTPATIENT
Start: 2022-06-10

## 2022-06-10 RX ORDER — OMEPRAZOLE 20 MG/1
20 CAPSULE, DELAYED RELEASE ORAL DAILY
Qty: 90 CAPSULE | Refills: 3 | Status: SHIPPED | OUTPATIENT
Start: 2022-06-10

## 2022-06-10 RX ORDER — ATORVASTATIN CALCIUM 10 MG/1
10 TABLET, FILM COATED ORAL DAILY
Qty: 90 TABLET | Refills: 3 | Status: SHIPPED | OUTPATIENT
Start: 2022-06-10

## 2022-06-10 RX ORDER — FUROSEMIDE 20 MG/1
20 TABLET ORAL DAILY PRN
Qty: 30 TABLET | Refills: 3 | Status: SHIPPED | OUTPATIENT
Start: 2022-06-10

## 2022-06-10 RX ORDER — LEVOTHYROXINE SODIUM 0.03 MG/1
12.5 TABLET ORAL DAILY
Qty: 45 TABLET | Refills: 3 | Status: SHIPPED | OUTPATIENT
Start: 2022-06-10

## 2022-06-10 RX ORDER — POTASSIUM CHLORIDE 750 MG/1
10 TABLET, EXTENDED RELEASE ORAL 2 TIMES DAILY
Qty: 60 TABLET | Refills: 3 | Status: SHIPPED | OUTPATIENT
Start: 2022-06-10

## 2022-06-10 RX ORDER — OXYBUTYNIN CHLORIDE 5 MG/1
5 TABLET ORAL 3 TIMES DAILY
Qty: 90 TABLET | Refills: 3 | Status: SHIPPED | OUTPATIENT
Start: 2022-06-10

## 2022-06-10 NOTE — TELEPHONE ENCOUNTER
I mailed the lab orders to the patient today written on top of the order to have them done on or around 09- and hi-lited it.

## 2022-06-10 NOTE — TELEPHONE ENCOUNTER
I called and spoke to the patient's emergency contact, Maddy Lopez. I let her know Laura's response regarding the patient's lab results. She voiced understanding and scheduled the patient's 6 month follow up for 12- at 2:00 pm.    Cari Sims, when the patient left the office on 06- you wanted her to schedule in 3 months and that was scheduled for 09-. Did you want to see the patient in 6 months or 3 months? Please advise.

## 2022-06-10 NOTE — TELEPHONE ENCOUNTER
I called and spoke to Silvia Armstrong, the patient's emergency contact. I let her know that Royce Alejandra wanted to see the patient in 3 months instead of 6 months so I told her that I was going to cancel the patient's appointment for 12- and that the patient will be seen on 09- instead. She voiced understanding.

## 2022-06-10 NOTE — TELEPHONE ENCOUNTER
----- Message from Antoinette Hodgkins, APRN - CNP sent at 6/10/2022  7:47 AM EDT -----  Labs are stable. Kidney function slightly diminished. Use the lasix only as needed for leg swelling. Use the celebrex once daily for arthritis pain. Continue to push fluids. Meds refilled and [facility] to [facility]. See back in 6 months. Lab orders in for before visit.

## 2022-09-19 ENCOUNTER — OFFICE VISIT (OUTPATIENT)
Dept: FAMILY MEDICINE CLINIC | Age: 62
End: 2022-09-19
Payer: MEDICARE

## 2022-09-19 VITALS
BODY MASS INDEX: 26.24 KG/M2 | OXYGEN SATURATION: 98 % | SYSTOLIC BLOOD PRESSURE: 132 MMHG | HEART RATE: 90 BPM | WEIGHT: 162.6 LBS | DIASTOLIC BLOOD PRESSURE: 86 MMHG

## 2022-09-19 DIAGNOSIS — Z23 ENCOUNTER FOR IMMUNIZATION: ICD-10-CM

## 2022-09-19 DIAGNOSIS — I10 BENIGN ESSENTIAL HTN: Primary | ICD-10-CM

## 2022-09-19 PROCEDURE — 90694 VACC AIIV4 NO PRSRV 0.5ML IM: CPT | Performed by: NURSE PRACTITIONER

## 2022-09-19 PROCEDURE — G8427 DOCREV CUR MEDS BY ELIG CLIN: HCPCS | Performed by: NURSE PRACTITIONER

## 2022-09-19 PROCEDURE — 3017F COLORECTAL CA SCREEN DOC REV: CPT | Performed by: NURSE PRACTITIONER

## 2022-09-19 PROCEDURE — 99213 OFFICE O/P EST LOW 20 MIN: CPT | Performed by: NURSE PRACTITIONER

## 2022-09-19 PROCEDURE — G0008 ADMIN INFLUENZA VIRUS VAC: HCPCS | Performed by: NURSE PRACTITIONER

## 2022-09-19 PROCEDURE — G8419 CALC BMI OUT NRM PARAM NOF/U: HCPCS | Performed by: NURSE PRACTITIONER

## 2022-09-19 PROCEDURE — 4004F PT TOBACCO SCREEN RCVD TLK: CPT | Performed by: NURSE PRACTITIONER

## 2022-09-19 RX ORDER — LISINOPRIL 5 MG/1
5 TABLET ORAL DAILY
Qty: 90 TABLET | Refills: 3 | Status: SHIPPED | OUTPATIENT
Start: 2022-09-19

## 2022-09-19 SDOH — ECONOMIC STABILITY: FOOD INSECURITY: WITHIN THE PAST 12 MONTHS, YOU WORRIED THAT YOUR FOOD WOULD RUN OUT BEFORE YOU GOT MONEY TO BUY MORE.: NEVER TRUE

## 2022-09-19 SDOH — ECONOMIC STABILITY: FOOD INSECURITY: WITHIN THE PAST 12 MONTHS, THE FOOD YOU BOUGHT JUST DIDN'T LAST AND YOU DIDN'T HAVE MONEY TO GET MORE.: NEVER TRUE

## 2022-09-19 ASSESSMENT — ENCOUNTER SYMPTOMS
COUGH: 0
CONSTIPATION: 0
DIARRHEA: 0
EYE REDNESS: 0
ABDOMINAL PAIN: 0
CHEST TIGHTNESS: 0
ABDOMINAL DISTENTION: 0
WHEEZING: 0
EYE PAIN: 0
SHORTNESS OF BREATH: 0

## 2022-09-19 ASSESSMENT — SOCIAL DETERMINANTS OF HEALTH (SDOH): HOW HARD IS IT FOR YOU TO PAY FOR THE VERY BASICS LIKE FOOD, HOUSING, MEDICAL CARE, AND HEATING?: NOT HARD AT ALL

## 2022-09-19 NOTE — PROGRESS NOTES
[address]  Dept: 421 [facility] (:  1960) is a 57 y.o. female,Established patient, here for evaluation of the following chief complaint(s):  Hypertension      ASSESSMENT/PLAN:  I have reviewed the patient's medical history in detail and updated the computerized patient record. HPI/ROS per the patient and caregiver. Overall non toxic in appearance. Answers questions appropriately. Conditions discussed and addressed this visit include:   Overall doing very well  Htn controlled  Tolerating the lisinopril without any issues. The patient is advised to begin progressive daily aerobic exercise program, follow a low fat, low cholesterol diet, attempt to lose weight, improve dietary compliance, continue current medications, continue current healthy lifestyle patterns, and return for routine annual checkups. 1. Benign essential HTN  2. Encounter for immunization  -     pneumococcal 13-valent conjugate (PREVNAR) injection 0.5 mL; 0.5 mL, IntraMUSCular, ONCE, 1 dose, On Mon 22 at 1100Shake well prior to use. Do not use if a homogenous white suspension does not form. -     Influenza, FLUAD, (age 72 y+), IM, Preservative Free, 0.5 mL    Return in about 6 months (around 3/19/2023) for Medication evaluation, Results review, Routine follow up. SUBJECTIVE/OBJECTIVE:  HPI  Here for follow up on her htn  Overall doing well  No acute concerns  Needs flu shot today  Just traveled with her sister to [place]. Had no chest pain, no sob  No leg edema  Tolerated a high level of activty  Review of Systems   Constitutional:  Negative for activity change, appetite change, fatigue and unexpected weight change. HENT:  Negative for ear discharge, ear pain, nosebleeds and tinnitus. Eyes:  Negative for pain and redness. Respiratory:  Negative for cough, chest tightness, shortness of breath and wheezing. Cardiovascular:  Negative for chest pain. Gastrointestinal:  Negative for abdominal distention, abdominal pain, constipation and diarrhea. Endocrine: Negative for cold intolerance and heat intolerance. Genitourinary:  Negative for difficulty urinating and dysuria. Musculoskeletal:  Negative for arthralgias and myalgias. Skin:  Negative for pallor and rash. Allergic/Immunologic: Negative for environmental allergies, food allergies and immunocompromised state. Neurological:  Negative for dizziness, weakness, light-headedness and numbness. Hematological:  Negative for adenopathy. Does not bruise/bleed easily. Psychiatric/Behavioral:  Negative for behavioral problems and decreased concentration. The patient is not nervous/anxious. All other systems reviewed and are negative. Physical Exam  Vitals and nursing note reviewed. Constitutional:       Appearance: Normal appearance. She is well-developed and normal weight. HENT:      Head: Normocephalic and atraumatic. Right Ear: External ear normal.      Left Ear: External ear normal.      Nose: Nose normal.      Mouth/Throat:      Mouth: Mucous membranes are moist.      Pharynx: No oropharyngeal exudate. Eyes:      General:         Right eye: No discharge. Left eye: No discharge. Conjunctiva/sclera: Conjunctivae normal.   Neck:      Thyroid: No thyromegaly. Cardiovascular:      Rate and Rhythm: Normal rate and regular rhythm. Pulses: Normal pulses. Heart sounds: Normal heart sounds. Pulmonary:      Effort: Pulmonary effort is normal. No respiratory distress. Breath sounds: Normal breath sounds. No wheezing or rales. Chest:      Chest wall: No tenderness. Abdominal:      General: Bowel sounds are normal. There is no distension. Palpations: Abdomen is soft. Tenderness: There is no abdominal tenderness. Musculoskeletal:         General: No tenderness. Normal range of motion. Cervical back: Normal range of motion and neck supple.  No muscular tenderness. Lymphadenopathy:      Cervical: No cervical adenopathy. Skin:     General: Skin is warm and dry. Capillary Refill: Capillary refill takes less than 2 seconds. Coloration: Skin is not pale. Findings: No erythema or rash. Neurological:      General: No focal deficit present. Mental Status: She is alert and oriented to person, place, and time. Mental status is at baseline. Cranial Nerves: No cranial nerve deficit. Motor: No abnormal muscle tone. Coordination: Coordination normal.      Deep Tendon Reflexes: Reflexes are normal and symmetric. Reflexes normal.   Psychiatric:         Behavior: Behavior normal.         Thought Content: Thought content normal.         Judgment: Judgment normal.               An electronic signature was used to authenticate this note.     --Octavia Medeiros, EMORY - CNP

## 2022-09-19 NOTE — PROGRESS NOTES
After obtaining consent, and per orders of Jennie Alexander CNP, injection of Flu given in Left deltoid by Juliann Barnhart ([facility]). Patient instructed to remain in clinic for 20 minutes afterwards, and to report any adverse reaction to me immediately. After obtaining consent, and per orders of Jennie Alexander CNP, injection of Prevnar 13 given in Right deltoid by Juliann Barnhart ([facility]). Patient instructed to remain in clinic for 20 minutes afterwards, and to report any adverse reaction to me immediately.

## 2023-06-28 ENCOUNTER — NURSE ONLY (OUTPATIENT)
Dept: LAB | Age: 63
End: 2023-06-28

## 2023-06-28 DIAGNOSIS — K21.00 GASTROESOPHAGEAL REFLUX DISEASE WITH ESOPHAGITIS WITHOUT HEMORRHAGE: ICD-10-CM

## 2023-06-28 DIAGNOSIS — E03.4 HYPOTHYROIDISM DUE TO ACQUIRED ATROPHY OF THYROID: ICD-10-CM

## 2023-06-28 DIAGNOSIS — E78.2 MIXED HYPERLIPIDEMIA: ICD-10-CM

## 2023-06-28 DIAGNOSIS — I10 BENIGN ESSENTIAL HTN: ICD-10-CM

## 2023-06-28 LAB
ALBUMIN SERPL BCG-MCNC: 4.6 G/DL (ref 3.5–5.1)
ALP SERPL-CCNC: 69 U/L (ref 38–126)
ALT SERPL W/O P-5'-P-CCNC: 9 U/L (ref 11–66)
ANION GAP SERPL CALC-SCNC: 14 MEQ/L (ref 8–16)
AST SERPL-CCNC: 17 U/L (ref 5–40)
BASOPHILS ABSOLUTE: 0.1 THOU/MM3 (ref 0–0.1)
BASOPHILS NFR BLD AUTO: 0.7 %
BILIRUB SERPL-MCNC: 0.2 MG/DL (ref 0.3–1.2)
BUN SERPL-MCNC: 29 MG/DL (ref 7–22)
CALCIUM SERPL-MCNC: 9.5 MG/DL (ref 8.5–10.5)
CHLORIDE SERPL-SCNC: 105 MEQ/L (ref 98–111)
CHOLEST SERPL-MCNC: 195 MG/DL (ref 100–199)
CO2 SERPL-SCNC: 23 MEQ/L (ref 23–33)
CREAT SERPL-MCNC: 1.7 MG/DL (ref 0.4–1.2)
CREAT UR-MCNC: 75.2 MG/DL
DEPRECATED RDW RBC AUTO: 52.6 FL (ref 35–45)
EOSINOPHIL NFR BLD AUTO: 1.5 %
EOSINOPHILS ABSOLUTE: 0.1 THOU/MM3 (ref 0–0.4)
ERYTHROCYTE [DISTWIDTH] IN BLOOD BY AUTOMATED COUNT: 14.4 % (ref 11.5–14.5)
GFR SERPL CREATININE-BSD FRML MDRD: 33 ML/MIN/1.73M2
GLUCOSE SERPL-MCNC: 76 MG/DL (ref 70–108)
HCT VFR BLD AUTO: 42.9 % (ref 37–47)
HDLC SERPL-MCNC: 61 MG/DL
HGB BLD-MCNC: 13.5 GM/DL (ref 12–16)
IMM GRANULOCYTES # BLD AUTO: 0.03 THOU/MM3 (ref 0–0.07)
IMM GRANULOCYTES NFR BLD AUTO: 0.4 %
LDLC SERPL CALC-MCNC: 114 MG/DL
LYMPHOCYTES ABSOLUTE: 1.6 THOU/MM3 (ref 1–4.8)
LYMPHOCYTES NFR BLD AUTO: 19 %
MCH RBC QN AUTO: 31.2 PG (ref 26–33)
MCHC RBC AUTO-ENTMCNC: 31.5 GM/DL (ref 32.2–35.5)
MCV RBC AUTO: 99.1 FL (ref 81–99)
MICROALBUMIN UR-MCNC: 1.5 MG/DL
MICROALBUMIN/CREAT RATIO PNL UR: 20 MG/G (ref 0–30)
MONOCYTES ABSOLUTE: 0.7 THOU/MM3 (ref 0.4–1.3)
MONOCYTES NFR BLD AUTO: 8.4 %
NEUTROPHILS NFR BLD AUTO: 70 %
NRBC BLD AUTO-RTO: 0 /100 WBC
PLATELET # BLD AUTO: 225 THOU/MM3 (ref 130–400)
PMV BLD AUTO: 11.6 FL (ref 9.4–12.4)
POTASSIUM SERPL-SCNC: 4.5 MEQ/L (ref 3.5–5.2)
PROT SERPL-MCNC: 6.9 G/DL (ref 6.1–8)
RBC # BLD AUTO: 4.33 MILL/MM3 (ref 4.2–5.4)
SEGMENTED NEUTROPHILS ABSOLUTE COUNT: 6 THOU/MM3 (ref 1.8–7.7)
SODIUM SERPL-SCNC: 142 MEQ/L (ref 135–145)
T4 FREE SERPL-MCNC: 0.79 NG/DL (ref 0.93–1.76)
TRIGL SERPL-MCNC: 101 MG/DL (ref 0–199)
TSH SERPL DL<=0.005 MIU/L-ACNC: 45.97 UIU/ML (ref 0.4–4.2)
WBC # BLD AUTO: 8.5 THOU/MM3 (ref 4.8–10.8)

## 2023-07-03 ENCOUNTER — OFFICE VISIT (OUTPATIENT)
Dept: FAMILY MEDICINE CLINIC | Age: 63
End: 2023-07-03
Payer: MEDICARE

## 2023-07-03 VITALS
SYSTOLIC BLOOD PRESSURE: 132 MMHG | HEIGHT: 66 IN | DIASTOLIC BLOOD PRESSURE: 88 MMHG | BODY MASS INDEX: 26.2 KG/M2 | OXYGEN SATURATION: 97 % | TEMPERATURE: 97.6 F | WEIGHT: 163 LBS | HEART RATE: 92 BPM

## 2023-07-03 DIAGNOSIS — Z00.00 MEDICARE ANNUAL WELLNESS VISIT, SUBSEQUENT: Primary | ICD-10-CM

## 2023-07-03 DIAGNOSIS — N18.32 STAGE 3B CHRONIC KIDNEY DISEASE (HCC): ICD-10-CM

## 2023-07-03 DIAGNOSIS — E78.2 MIXED HYPERLIPIDEMIA: ICD-10-CM

## 2023-07-03 DIAGNOSIS — K21.00 GASTROESOPHAGEAL REFLUX DISEASE WITH ESOPHAGITIS WITHOUT HEMORRHAGE: ICD-10-CM

## 2023-07-03 DIAGNOSIS — N39.3 STRESS INCONTINENCE: ICD-10-CM

## 2023-07-03 DIAGNOSIS — E03.4 HYPOTHYROIDISM DUE TO ACQUIRED ATROPHY OF THYROID: ICD-10-CM

## 2023-07-03 DIAGNOSIS — J84.9 INTERSTITIAL LUNG DISEASE (HCC): ICD-10-CM

## 2023-07-03 PROCEDURE — 3017F COLORECTAL CA SCREEN DOC REV: CPT | Performed by: NURSE PRACTITIONER

## 2023-07-03 PROCEDURE — 3075F SYST BP GE 130 - 139MM HG: CPT | Performed by: NURSE PRACTITIONER

## 2023-07-03 PROCEDURE — G0439 PPPS, SUBSEQ VISIT: HCPCS | Performed by: NURSE PRACTITIONER

## 2023-07-03 PROCEDURE — 3079F DIAST BP 80-89 MM HG: CPT | Performed by: NURSE PRACTITIONER

## 2023-07-03 RX ORDER — LEVOTHYROXINE SODIUM 0.12 MG/1
125 TABLET ORAL DAILY
Qty: 30 TABLET | Refills: 0 | Status: SHIPPED | OUTPATIENT
Start: 2023-07-03

## 2023-07-03 RX ORDER — OXYBUTYNIN CHLORIDE 5 MG/1
5 TABLET ORAL 3 TIMES DAILY
Qty: 90 TABLET | Refills: 3 | Status: SHIPPED | OUTPATIENT
Start: 2023-07-03

## 2023-07-03 RX ORDER — OMEPRAZOLE 20 MG/1
20 CAPSULE, DELAYED RELEASE ORAL DAILY
Qty: 90 CAPSULE | Refills: 3 | Status: SHIPPED | OUTPATIENT
Start: 2023-07-03

## 2023-07-03 RX ORDER — ATORVASTATIN CALCIUM 10 MG/1
10 TABLET, FILM COATED ORAL DAILY
Qty: 90 TABLET | Refills: 3 | Status: SHIPPED | OUTPATIENT
Start: 2023-07-03

## 2023-07-03 SDOH — ECONOMIC STABILITY: FOOD INSECURITY: WITHIN THE PAST 12 MONTHS, YOU WORRIED THAT YOUR FOOD WOULD RUN OUT BEFORE YOU GOT MONEY TO BUY MORE.: NEVER TRUE

## 2023-07-03 SDOH — ECONOMIC STABILITY: INCOME INSECURITY: HOW HARD IS IT FOR YOU TO PAY FOR THE VERY BASICS LIKE FOOD, HOUSING, MEDICAL CARE, AND HEATING?: NOT HARD AT ALL

## 2023-07-03 SDOH — HEALTH STABILITY: PHYSICAL HEALTH: ON AVERAGE, HOW MANY DAYS PER WEEK DO YOU ENGAGE IN MODERATE TO STRENUOUS EXERCISE (LIKE A BRISK WALK)?: 1 DAY

## 2023-07-03 SDOH — ECONOMIC STABILITY: HOUSING INSECURITY
IN THE LAST 12 MONTHS, WAS THERE A TIME WHEN YOU DID NOT HAVE A STEADY PLACE TO SLEEP OR SLEPT IN A SHELTER (INCLUDING NOW)?: NO

## 2023-07-03 SDOH — ECONOMIC STABILITY: TRANSPORTATION INSECURITY
IN THE PAST 12 MONTHS, HAS LACK OF TRANSPORTATION KEPT YOU FROM MEETINGS, WORK, OR FROM GETTING THINGS NEEDED FOR DAILY LIVING?: NO

## 2023-07-03 SDOH — HEALTH STABILITY: PHYSICAL HEALTH: ON AVERAGE, HOW MANY MINUTES DO YOU ENGAGE IN EXERCISE AT THIS LEVEL?: 30 MIN

## 2023-07-03 SDOH — ECONOMIC STABILITY: FOOD INSECURITY: WITHIN THE PAST 12 MONTHS, THE FOOD YOU BOUGHT JUST DIDN'T LAST AND YOU DIDN'T HAVE MONEY TO GET MORE.: NEVER TRUE

## 2023-07-03 ASSESSMENT — ENCOUNTER SYMPTOMS
EYE PAIN: 0
WHEEZING: 0
ABDOMINAL DISTENTION: 0
COUGH: 0
ABDOMINAL PAIN: 0
SHORTNESS OF BREATH: 0
CONSTIPATION: 0
CHEST TIGHTNESS: 0
EYE REDNESS: 0
DIARRHEA: 0

## 2023-07-03 ASSESSMENT — LIFESTYLE VARIABLES
HOW MANY STANDARD DRINKS CONTAINING ALCOHOL DO YOU HAVE ON A TYPICAL DAY: 1 OR 2
HOW MANY STANDARD DRINKS CONTAINING ALCOHOL DO YOU HAVE ON A TYPICAL DAY: 1
HOW OFTEN DO YOU HAVE SIX OR MORE DRINKS ON ONE OCCASION: 1
HOW OFTEN DO YOU HAVE A DRINK CONTAINING ALCOHOL: MONTHLY OR LESS
HOW OFTEN DO YOU HAVE A DRINK CONTAINING ALCOHOL: 2

## 2023-07-03 ASSESSMENT — PATIENT HEALTH QUESTIONNAIRE - PHQ9
SUM OF ALL RESPONSES TO PHQ QUESTIONS 1-9: 0
1. LITTLE INTEREST OR PLEASURE IN DOING THINGS: 0
SUM OF ALL RESPONSES TO PHQ9 QUESTIONS 1 & 2: 0
SUM OF ALL RESPONSES TO PHQ QUESTIONS 1-9: 0
SUM OF ALL RESPONSES TO PHQ QUESTIONS 1-9: 0
2. FEELING DOWN, DEPRESSED OR HOPELESS: 0
SUM OF ALL RESPONSES TO PHQ QUESTIONS 1-9: 0

## 2023-07-03 NOTE — PATIENT INSTRUCTIONS
Learning About Managing Anger  What causes anger? Many things can cause anger: Stress at work or at home. Social situations that make you angry. A response to everyday events. Anger signals your body to prepare for a fight. This reaction is often called \"fight or flight. \" When you get angry, adrenaline and other hormones are released into your blood. Then your blood pressure goes up, your heart beats faster, and you breathe faster. When you express anger in a healthy way, it can inspire change and make you productive. But if you don't have the skills to express anger in a healthy way, anger can build up. You may hurt others--and yourself--emotionally and even physically. Violent behavior often starts with verbal threats or fairly minor incidents. But over time, it can involve physical harm. It can include physical, verbal, or sexual abuse of an intimate partner (domestic violence), a child (child abuse), or an older adult (elder abuse). It can also make you sick. Anger and constant hostility keep your blood pressure high. They increase your chances of having another health problem, such as depression, a heart attack, or a stroke. Some people with post-traumatic stress disorder (PTSD) feel angry and on alert all the time. It may feel like there are no other ways to react when you are angry. But when you learn to work with anger in appropriate and healthy ways, your anger no longer controls you. How can you manage your anger? The first step to managing anger is to be more aware of it. Note the thoughts, feelings, and emotions that you have when you get angry. Practice noticing these signs of anger when you are calm. If you are more aware of the signs of anger, you can take steps to manage it. Here are a few tips: Think before you act. Take time to stop and cool down when you feel yourself getting angry. Count to 10 while you take slow, steady breaths. Practice some other form of mental relaxation.   Learn

## 2023-07-03 NOTE — PROGRESS NOTES
[facility]  Dept: [address] [facility] (:  1960) is a 57 y.o. female,Established patient, here for evaluation of the following chief complaint(s):  Medicare AWV      ASSESSMENT/PLAN:  I have reviewed the patient's medical history in detail and updated the computerized patient record. HPI/ROS per the patient and caregiver. Overall non toxic in appearance. Answers questions appropriately. Conditions discussed and addressed this visit include:   Reviewed all labs with pt  Adjust thyroid to 125 mcg and recheck in 1 month  Recheck kidney function in 1 month  Refer to Nephro if GFR remains below 35 ml/min  Continue to push fluids  Avoid all NSAID products  Limit na in diet  Follow up in 4 weeks    1. Medicare annual wellness visit, subsequent  2. Mixed hyperlipidemia  -     atorvastatin (LIPITOR) 10 MG tablet; Take 1 tablet by mouth daily, Disp-90 tablet, R-3Normal  3. Stage 3b chronic kidney disease ([address])  -     Basic Metabolic Panel; Future  4. Interstitial lung disease ([address])  5. Hypothyroidism due to acquired atrophy of thyroid  -     levothyroxine (SYNTHROID) 125 MCG tablet; Take 1 tablet by mouth daily, Disp-30 tablet, R-0Normal  -     TSH with Reflex; Future  6. Gastroesophageal reflux disease with esophagitis without hemorrhage  -     omeprazole (PRILOSEC) 20 MG delayed release capsule; Take 1 capsule by mouth Daily, Disp-90 capsule, R-3Normal  7. Stress incontinence  -     oxybutynin (DITROPAN) 5 MG tablet; Take 1 tablet by mouth 3 times daily, Disp-90 tablet, R-3Normal      Return in 1 year (on 7/3/2024).     SUBJECTIVE/OBJECTIVE:  HPI  Here for annual physical  Overall doing well  No acute changes  Continues to smoke  Weight stable  Urine output good but pt does report sometimes it looks dark  She is on water and propel only with 1 cup coffee in morning  No NSAID use  Uses tylenol only and sparingly  No edema  No chest pain  No sob  Activity level

## 2023-08-25 DIAGNOSIS — E03.4 HYPOTHYROIDISM DUE TO ACQUIRED ATROPHY OF THYROID: ICD-10-CM

## 2023-08-25 RX ORDER — LEVOTHYROXINE SODIUM 0.12 MG/1
125 TABLET ORAL DAILY
Qty: 90 TABLET | Refills: 1 | Status: SHIPPED | OUTPATIENT
Start: 2023-08-25

## 2023-08-31 ENCOUNTER — COMMUNITY OUTREACH (OUTPATIENT)
Dept: FAMILY MEDICINE CLINIC | Age: 63
End: 2023-08-31

## 2024-04-12 DIAGNOSIS — E03.4 HYPOTHYROIDISM DUE TO ACQUIRED ATROPHY OF THYROID: ICD-10-CM

## 2024-04-12 RX ORDER — LEVOTHYROXINE SODIUM 0.12 MG/1
125 TABLET ORAL DAILY
Qty: 90 TABLET | Refills: 1 | Status: SHIPPED | OUTPATIENT
Start: 2024-04-12

## 2024-07-09 DIAGNOSIS — K21.00 GASTROESOPHAGEAL REFLUX DISEASE WITH ESOPHAGITIS WITHOUT HEMORRHAGE: ICD-10-CM

## 2024-07-09 DIAGNOSIS — E78.2 MIXED HYPERLIPIDEMIA: ICD-10-CM

## 2024-07-09 RX ORDER — OMEPRAZOLE 20 MG/1
20 CAPSULE, DELAYED RELEASE ORAL DAILY
Qty: 30 CAPSULE | Refills: 0 | Status: SHIPPED | OUTPATIENT
Start: 2024-07-09

## 2024-07-09 RX ORDER — ATORVASTATIN CALCIUM 10 MG/1
10 TABLET, FILM COATED ORAL DAILY
Qty: 30 TABLET | Refills: 0 | Status: SHIPPED | OUTPATIENT
Start: 2024-07-09

## 2024-07-09 NOTE — TELEPHONE ENCOUNTER
Our office received a fax for refills on the following medication from [facility] in [facility].  Atorvastatin 10 mg, takes one tablet daily and omeprazole 20 mg, takes one tablet daily.

## 2024-07-09 NOTE — TELEPHONE ENCOUNTER
I called and spoke to the patient's EC, Cindy. I let her know that Laura has left the practice and that we can send in a 30 day supply on these medications but the patient will need to find a new PCP. She voiced understanding.

## 2024-10-15 ENCOUNTER — TELEPHONE (OUTPATIENT)
Dept: PHARMACY | Facility: CLINIC | Age: 64
End: 2024-10-15

## 2024-10-15 NOTE — TELEPHONE ENCOUNTER
[facility] CLINICAL PHARMACY: ADHERENCE REVIEW  Identified care gap per [employer]: fills at [facility] : Statin adherence    Patient also appears to be prescribed: Statin    ASSESSMENT  STATIN ADHERENCE    Insurance Records claims through 10/07/2024 (Prior Year PDC = not reported; YTD PDC = 78%; Potential Fail Date: 10/18/24):   ATORVASTATIN TAB 10MG last filled on 24 for 30 day supply. Next refill due: 08/10/24    Prescribed si tablet/capsule daily    Per Reconcile Dispense History: last filled on 24 for 30 day supply.     Per [facility]:  0 refills remaining.  Pharmacy sent a refill request     Lab Results   Component Value Date    CHOL 195 2023    TRIG 101 2023    HDL 61 2023     Lab Results   Component Value Date     2023      ALT   Date Value Ref Range Status   2023 9 (L) 11 - 66 U/L Final     Comment:     Performed at [facility] [address] [place] [address]     AST   Date Value Ref Range Status   2023 17 5 - 40 U/L Final     The ASCVD Risk score (Remington DK, et al., 2019) failed to calculate for the following reasons:    The systolic blood pressure is missing     PLAN  The following are interventions that have been identified:   Patient OVERDUE refilling ATORVASTATIN TAB 10MG and active on home medication list.     No patient outreach planned at this time.  I will f/u to see if new rx is sent to the pharmacy     Recent Visits  Date Type Provider Dept   23 Office Visit Laura Tran, APRN - CNP [facility]   Showing recent visits within past 540 days with a meds authorizing provider and meeting all other requirements  Future Appointments  No visits were found meeting these conditions.  Showing future appointments within next 150 days with a meds authorizing provider and meeting all other requirements    Future Appointments   Date Time Provider Department Center   2024  1:00 PM Counts,

## 2024-11-04 ENCOUNTER — OFFICE VISIT (OUTPATIENT)
Dept: FAMILY MEDICINE CLINIC | Age: 64
End: 2024-11-04

## 2024-11-04 VITALS
OXYGEN SATURATION: 97 % | HEIGHT: 66 IN | HEART RATE: 88 BPM | TEMPERATURE: 98.4 F | BODY MASS INDEX: 24.75 KG/M2 | DIASTOLIC BLOOD PRESSURE: 88 MMHG | WEIGHT: 154 LBS | SYSTOLIC BLOOD PRESSURE: 150 MMHG

## 2024-11-04 DIAGNOSIS — J84.9 INTERSTITIAL LUNG DISEASE (HCC): ICD-10-CM

## 2024-11-04 DIAGNOSIS — K21.00 GASTROESOPHAGEAL REFLUX DISEASE WITH ESOPHAGITIS WITHOUT HEMORRHAGE: ICD-10-CM

## 2024-11-04 DIAGNOSIS — E03.4 HYPOTHYROIDISM DUE TO ACQUIRED ATROPHY OF THYROID: ICD-10-CM

## 2024-11-04 DIAGNOSIS — Z12.2 SCREENING FOR LUNG CANCER: ICD-10-CM

## 2024-11-04 DIAGNOSIS — N18.4 CHRONIC KIDNEY DISEASE, STAGE 4 (SEVERE) (HCC): ICD-10-CM

## 2024-11-04 DIAGNOSIS — Z76.89 ENCOUNTER TO ESTABLISH CARE: Primary | ICD-10-CM

## 2024-11-04 DIAGNOSIS — E78.2 MIXED HYPERLIPIDEMIA: ICD-10-CM

## 2024-11-04 DIAGNOSIS — Z12.11 COLON CANCER SCREENING: ICD-10-CM

## 2024-11-04 DIAGNOSIS — R03.0 ELEVATED BLOOD PRESSURE READING: ICD-10-CM

## 2024-11-04 DIAGNOSIS — Z87.891 PERSONAL HISTORY OF TOBACCO USE: ICD-10-CM

## 2024-11-04 DIAGNOSIS — Z12.31 BREAST CANCER SCREENING BY MAMMOGRAM: ICD-10-CM

## 2024-11-04 RX ORDER — ATORVASTATIN CALCIUM 10 MG/1
10 TABLET, FILM COATED ORAL DAILY
Qty: 30 TABLET | Refills: 0 | Status: SHIPPED | OUTPATIENT
Start: 2024-11-04

## 2024-11-04 RX ORDER — LEVOTHYROXINE SODIUM 125 UG/1
125 TABLET ORAL DAILY
Qty: 90 TABLET | Refills: 1 | Status: SHIPPED | OUTPATIENT
Start: 2024-11-04

## 2024-11-04 SDOH — ECONOMIC STABILITY: FOOD INSECURITY: WITHIN THE PAST 12 MONTHS, YOU WORRIED THAT YOUR FOOD WOULD RUN OUT BEFORE YOU GOT MONEY TO BUY MORE.: NEVER TRUE

## 2024-11-04 SDOH — ECONOMIC STABILITY: FOOD INSECURITY: WITHIN THE PAST 12 MONTHS, THE FOOD YOU BOUGHT JUST DIDN'T LAST AND YOU DIDN'T HAVE MONEY TO GET MORE.: NEVER TRUE

## 2024-11-04 SDOH — ECONOMIC STABILITY: INCOME INSECURITY: HOW HARD IS IT FOR YOU TO PAY FOR THE VERY BASICS LIKE FOOD, HOUSING, MEDICAL CARE, AND HEATING?: NOT HARD AT ALL

## 2024-11-04 ASSESSMENT — ENCOUNTER SYMPTOMS
BLOOD IN STOOL: 0
ABDOMINAL PAIN: 0
COLOR CHANGE: 0
DIARRHEA: 0
CHEST TIGHTNESS: 0
VOMITING: 0
NAUSEA: 0
BACK PAIN: 0
COUGH: 0
CONSTIPATION: 0
RHINORRHEA: 0
SHORTNESS OF BREATH: 0
VOICE CHANGE: 0
WHEEZING: 0
SORE THROAT: 0
ABDOMINAL DISTENTION: 0
SINUS PRESSURE: 0

## 2024-11-04 ASSESSMENT — PATIENT HEALTH QUESTIONNAIRE - PHQ9
SUM OF ALL RESPONSES TO PHQ QUESTIONS 1-9: 0
SUM OF ALL RESPONSES TO PHQ QUESTIONS 1-9: 0
1. LITTLE INTEREST OR PLEASURE IN DOING THINGS: NOT AT ALL
SUM OF ALL RESPONSES TO PHQ QUESTIONS 1-9: 0
SUM OF ALL RESPONSES TO PHQ QUESTIONS 1-9: 0
2. FEELING DOWN, DEPRESSED OR HOPELESS: NOT AT ALL
SUM OF ALL RESPONSES TO PHQ9 QUESTIONS 1 & 2: 0

## 2024-11-04 NOTE — ASSESSMENT & PLAN NOTE
Labs ordered will evaluate    Orders:    Comprehensive Metabolic Panel; Future    TSH; Future    CBC with Auto Differential; Future

## 2024-11-04 NOTE — PROGRESS NOTES
Exercise Vital Sign     Days of Exercise per Week: 1 day     Minutes of Exercise per Session: 30 min   Housing Stability: Unknown (11/4/2024)    Housing Stability Vital Sign     Homeless in the Last Year: No        Allergies   Allergen Reactions    Tramadol         Current Outpatient Medications on File Prior to Visit   Medication Sig Dispense Refill    Omega-3 Fatty Acids (FISH OIL PO) Take by mouth daily      acetaminophen (TYLENOL) 500 MG tablet Take 2 tablets by mouth 3 times daily as needed for Pain 180 tablet 0    calcium carbonate (TUMS) 500 MG chewable tablet Take 1 tablet by mouth as needed for Heartburn       No current facility-administered medications on file prior to visit.        Objective     Physical Exam  Vitals and nursing note reviewed.   Constitutional:       General: She is not in acute distress.     Appearance: Normal appearance. She is well-developed. She is not toxic-appearing.   HENT:      Head: Normocephalic.      Right Ear: Tympanic membrane normal.      Left Ear: Tympanic membrane normal.      Mouth/Throat:      Pharynx: Uvula midline.   Eyes:      Conjunctiva/sclera: Conjunctivae normal.   Cardiovascular:      Rate and Rhythm: Normal rate and regular rhythm.      Heart sounds: Normal heart sounds, S1 normal and S2 normal. No murmur heard.  Pulmonary:      Effort: Pulmonary effort is normal. No respiratory distress.      Breath sounds: Normal breath sounds.   Chest:      Chest wall: No tenderness.   Abdominal:      General: Bowel sounds are normal. There is no distension.      Palpations: Abdomen is soft.      Tenderness: There is no abdominal tenderness.   Musculoskeletal:         General: Normal range of motion.      Cervical back: Normal range of motion and neck supple.   Lymphadenopathy:      Cervical: No cervical adenopathy.   Skin:     General: Skin is warm and dry.      Capillary Refill: Capillary refill takes less than 2 seconds.   Neurological:      General: No focal deficit

## 2024-11-04 NOTE — ASSESSMENT & PLAN NOTE
Refilled omeprazole    Orders:    CBC with Auto Differential; Future    omeprazole (PRILOSEC) 20 MG delayed release capsule; Take 1 capsule by mouth Daily

## 2024-11-04 NOTE — ASSESSMENT & PLAN NOTE
Labs ordered will evaluate, refilled Synthroid    Orders:    TSH; Future    T4, Free; Future    levothyroxine (SYNTHROID) 125 MCG tablet; Take 1 tablet by mouth daily

## 2024-11-04 NOTE — ASSESSMENT & PLAN NOTE
Labs ordered to evaluate  Orders:    Lipid Panel; Future    CBC with Auto Differential; Future    Creatinine, Urine, 24 Hour; Future    atorvastatin (LIPITOR) 10 MG tablet; Take 1 tablet by mouth daily

## 2024-11-13 ENCOUNTER — LAB (OUTPATIENT)
Dept: LAB | Age: 64
End: 2024-11-13

## 2024-11-13 ENCOUNTER — HOSPITAL ENCOUNTER (OUTPATIENT)
Dept: MAMMOGRAPHY | Age: 64
Discharge: HOME | End: 2024-11-17
Payer: MEDICARE

## 2024-11-13 DIAGNOSIS — K21.00 GASTROESOPHAGEAL REFLUX DISEASE WITH ESOPHAGITIS WITHOUT HEMORRHAGE: ICD-10-CM

## 2024-11-13 DIAGNOSIS — Z76.89 ENCOUNTER TO ESTABLISH CARE: ICD-10-CM

## 2024-11-13 DIAGNOSIS — N18.4 CHRONIC KIDNEY DISEASE, STAGE 4 (SEVERE) (HCC): ICD-10-CM

## 2024-11-13 DIAGNOSIS — Z12.31 BREAST CANCER SCREENING BY MAMMOGRAM: ICD-10-CM

## 2024-11-13 DIAGNOSIS — E78.2 MIXED HYPERLIPIDEMIA: ICD-10-CM

## 2024-11-13 DIAGNOSIS — E03.4 HYPOTHYROIDISM DUE TO ACQUIRED ATROPHY OF THYROID: ICD-10-CM

## 2024-11-13 LAB
ALBUMIN SERPL BCG-MCNC: 4.3 G/DL (ref 3.5–5.1)
ALP SERPL-CCNC: 81 U/L (ref 38–126)
ALT SERPL W/O P-5'-P-CCNC: 10 U/L (ref 11–66)
ANION GAP SERPL CALC-SCNC: 11 MEQ/L (ref 8–16)
AST SERPL-CCNC: 15 U/L (ref 5–40)
BASOPHILS ABSOLUTE: 0.1 THOU/MM3 (ref 0–0.1)
BASOPHILS NFR BLD AUTO: 0.6 %
BILIRUB SERPL-MCNC: 0.3 MG/DL (ref 0.3–1.2)
BUN SERPL-MCNC: 15 MG/DL (ref 7–22)
CALCIUM SERPL-MCNC: 9.2 MG/DL (ref 8.5–10.5)
CHLORIDE SERPL-SCNC: 105 MEQ/L (ref 98–111)
CHOLEST SERPL-MCNC: 200 MG/DL (ref 100–199)
CO2 SERPL-SCNC: 24 MEQ/L (ref 23–33)
CREAT SERPL-MCNC: 1.3 MG/DL (ref 0.4–1.2)
DEPRECATED RDW RBC AUTO: 51.2 FL (ref 35–45)
EOSINOPHIL NFR BLD AUTO: 1.1 %
EOSINOPHILS ABSOLUTE: 0.1 THOU/MM3 (ref 0–0.4)
ERYTHROCYTE [DISTWIDTH] IN BLOOD BY AUTOMATED COUNT: 13.8 % (ref 11.5–14.5)
GFR SERPL CREATININE-BSD FRML MDRD: 45 ML/MIN/1.73M2
GLUCOSE SERPL-MCNC: 85 MG/DL (ref 70–108)
HCT VFR BLD AUTO: 44.5 % (ref 37–47)
HDLC SERPL-MCNC: 67 MG/DL
HGB BLD-MCNC: 13.9 GM/DL (ref 12–16)
IMM GRANULOCYTES # BLD AUTO: 0.08 THOU/MM3 (ref 0–0.07)
IMM GRANULOCYTES NFR BLD AUTO: 0.9 %
LDLC SERPL CALC-MCNC: 101 MG/DL
LYMPHOCYTES ABSOLUTE: 1.5 THOU/MM3 (ref 1–4.8)
LYMPHOCYTES NFR BLD AUTO: 16.5 %
MCH RBC QN AUTO: 31.2 PG (ref 26–33)
MCHC RBC AUTO-ENTMCNC: 31.2 GM/DL (ref 32.2–35.5)
MCV RBC AUTO: 100 FL (ref 81–99)
MONOCYTES ABSOLUTE: 0.6 THOU/MM3 (ref 0.4–1.3)
MONOCYTES NFR BLD AUTO: 7 %
NEUTROPHILS ABSOLUTE: 6.7 THOU/MM3 (ref 1.8–7.7)
NEUTROPHILS NFR BLD AUTO: 73.9 %
NRBC BLD AUTO-RTO: 0 /100 WBC
PLATELET # BLD AUTO: 220 THOU/MM3 (ref 130–400)
PMV BLD AUTO: 11.3 FL (ref 9.4–12.4)
POTASSIUM SERPL-SCNC: 4.1 MEQ/L (ref 3.5–5.2)
PROT SERPL-MCNC: 7.3 G/DL (ref 6.1–8)
RBC # BLD AUTO: 4.45 MILL/MM3 (ref 4.2–5.4)
SODIUM SERPL-SCNC: 140 MEQ/L (ref 135–145)
T4 FREE SERPL-MCNC: 0.95 NG/DL (ref 0.93–1.68)
TRIGL SERPL-MCNC: 162 MG/DL (ref 0–199)
TSH SERPL DL<=0.005 MIU/L-ACNC: 32.52 UIU/ML (ref 0.4–4.2)
WBC # BLD AUTO: 9 THOU/MM3 (ref 4.8–10.8)

## 2024-11-13 PROCEDURE — 77063 BREAST TOMOSYNTHESIS BI: CPT

## 2024-11-17 PROCEDURE — 82570 ASSAY OF URINE CREATININE: CPT

## 2024-11-18 LAB
CREAT 24H UR-MRATE: 0.2 GM/24HR (ref 0.7–1.6)
CREAT UR-MCNC: 33.9 MG/DL
HOURS COLLECTED: 24 HRS
URINE VOLUME, 24 HOUR: 590 ML

## 2024-11-19 LAB — NONINV COLON CA DNA+OCC BLD SCRN STL QL: NORMAL

## 2024-12-20 LAB — NONINV COLON CA DNA+OCC BLD SCRN STL QL: NEGATIVE

## 2025-02-11 ENCOUNTER — TELEPHONE (OUTPATIENT)
Dept: FAMILY MEDICINE CLINIC | Age: 65
End: 2025-02-11

## 2025-02-11 DIAGNOSIS — K21.00 GASTROESOPHAGEAL REFLUX DISEASE WITH ESOPHAGITIS WITHOUT HEMORRHAGE: ICD-10-CM

## 2025-02-11 DIAGNOSIS — E03.4 HYPOTHYROIDISM DUE TO ACQUIRED ATROPHY OF THYROID: ICD-10-CM

## 2025-02-11 DIAGNOSIS — E78.2 MIXED HYPERLIPIDEMIA: ICD-10-CM

## 2025-02-11 RX ORDER — LEVOTHYROXINE SODIUM 125 UG/1
125 TABLET ORAL DAILY
Qty: 90 TABLET | Refills: 1 | Status: SHIPPED | OUTPATIENT
Start: 2025-02-11

## 2025-02-11 RX ORDER — ATORVASTATIN CALCIUM 10 MG/1
10 TABLET, FILM COATED ORAL DAILY
Qty: 30 TABLET | Refills: 2 | Status: SHIPPED | OUTPATIENT
Start: 2025-02-11

## 2025-02-11 NOTE — TELEPHONE ENCOUNTER
I called Mariluz back and let her know that Abhijit changed the preferred pharmacy as requested by Mariluz, Mariluz stated understanding!

## 2025-02-11 NOTE — TELEPHONE ENCOUNTER
Mariluz called wanting to update her pharmacy. Due to [facility] closing she will now be using [facility] and would like all her medications from here on out to go through [facility].

## 2025-04-02 ENCOUNTER — LAB (OUTPATIENT)
Dept: LAB | Age: 65
End: 2025-04-02

## 2025-04-02 ENCOUNTER — OFFICE VISIT (OUTPATIENT)
Dept: FAMILY MEDICINE CLINIC | Age: 65
End: 2025-04-02
Payer: MEDICARE

## 2025-04-02 VITALS
SYSTOLIC BLOOD PRESSURE: 138 MMHG | OXYGEN SATURATION: 96 % | DIASTOLIC BLOOD PRESSURE: 88 MMHG | TEMPERATURE: 97.7 F | HEART RATE: 92 BPM

## 2025-04-02 DIAGNOSIS — Z78.0 POST-MENOPAUSAL: ICD-10-CM

## 2025-04-02 DIAGNOSIS — R45.4 IRRITABILITY AND ANGER: ICD-10-CM

## 2025-04-02 DIAGNOSIS — E03.4 HYPOTHYROIDISM DUE TO ACQUIRED ATROPHY OF THYROID: ICD-10-CM

## 2025-04-02 DIAGNOSIS — N18.4 CHRONIC KIDNEY DISEASE, STAGE 4 (SEVERE) (HCC): ICD-10-CM

## 2025-04-02 DIAGNOSIS — J84.9 INTERSTITIAL LUNG DISEASE (HCC): ICD-10-CM

## 2025-04-02 DIAGNOSIS — M85.859 OSTEOPENIA OF NECK OF FEMUR, UNSPECIFIED LATERALITY: ICD-10-CM

## 2025-04-02 DIAGNOSIS — R45.4 IRRITABILITY AND ANGER: Primary | ICD-10-CM

## 2025-04-02 LAB
ALBUMIN SERPL BCG-MCNC: 4.5 G/DL (ref 3.4–4.9)
ALP SERPL-CCNC: 72 U/L (ref 35–104)
ALT SERPL W/O P-5'-P-CCNC: 13 U/L (ref 10–35)
ANION GAP SERPL CALC-SCNC: 13 MEQ/L (ref 8–16)
AST SERPL-CCNC: 20 U/L (ref 10–35)
BASOPHILS ABSOLUTE: 0.1 THOU/MM3 (ref 0–0.1)
BASOPHILS NFR BLD AUTO: 1 %
BILIRUB SERPL-MCNC: 0.2 MG/DL (ref 0.3–1.2)
BUN SERPL-MCNC: 17 MG/DL (ref 8–23)
CALCIUM SERPL-MCNC: 9.5 MG/DL (ref 8.8–10.2)
CHLORIDE SERPL-SCNC: 106 MEQ/L (ref 98–111)
CO2 SERPL-SCNC: 23 MEQ/L (ref 22–29)
CREAT SERPL-MCNC: 1.3 MG/DL (ref 0.5–0.9)
DEPRECATED MEAN GLUCOSE BLD GHB EST-ACNC: 111 MG/DL (ref 70–126)
DEPRECATED RDW RBC AUTO: 52.7 FL (ref 35–45)
EOSINOPHIL NFR BLD AUTO: 1.6 %
EOSINOPHILS ABSOLUTE: 0.1 THOU/MM3 (ref 0–0.4)
ERYTHROCYTE [DISTWIDTH] IN BLOOD BY AUTOMATED COUNT: 14.4 % (ref 11.5–14.5)
GFR SERPL CREATININE-BSD FRML MDRD: 45 ML/MIN/1.73M2
GLUCOSE SERPL-MCNC: 80 MG/DL (ref 74–109)
HBA1C MFR BLD HPLC: 5.7 % (ref 4–6)
HCT VFR BLD AUTO: 44.5 % (ref 37–47)
HGB BLD-MCNC: 14.1 GM/DL (ref 12–16)
IMM GRANULOCYTES # BLD AUTO: 0.02 THOU/MM3 (ref 0–0.07)
IMM GRANULOCYTES NFR BLD AUTO: 0.3 %
LYMPHOCYTES ABSOLUTE: 1.3 THOU/MM3 (ref 1–4.8)
LYMPHOCYTES NFR BLD AUTO: 18.5 %
MCH RBC QN AUTO: 31.3 PG (ref 26–33)
MCHC RBC AUTO-ENTMCNC: 31.7 GM/DL (ref 32.2–35.5)
MCV RBC AUTO: 98.7 FL (ref 81–99)
MONOCYTES ABSOLUTE: 0.6 THOU/MM3 (ref 0.4–1.3)
MONOCYTES NFR BLD AUTO: 9 %
NEUTROPHILS ABSOLUTE: 4.9 THOU/MM3 (ref 1.8–7.7)
NEUTROPHILS NFR BLD AUTO: 69.6 %
NRBC BLD AUTO-RTO: 0 /100 WBC
PLATELET # BLD AUTO: 232 THOU/MM3 (ref 130–400)
PMV BLD AUTO: 11.3 FL (ref 9.4–12.4)
POTASSIUM SERPL-SCNC: 4.4 MEQ/L (ref 3.5–5.2)
PROT SERPL-MCNC: 7.3 G/DL (ref 6.4–8.3)
RBC # BLD AUTO: 4.51 MILL/MM3 (ref 4.2–5.4)
SODIUM SERPL-SCNC: 142 MEQ/L (ref 135–145)
T4 FREE SERPL-MCNC: 1.2 NG/DL (ref 0.92–1.68)
TSH SERPL DL<=0.05 MIU/L-ACNC: 10.6 UIU/ML (ref 0.27–4.2)
WBC # BLD AUTO: 7 THOU/MM3 (ref 4.8–10.8)

## 2025-04-02 PROCEDURE — 3075F SYST BP GE 130 - 139MM HG: CPT | Performed by: NURSE PRACTITIONER

## 2025-04-02 PROCEDURE — G8427 DOCREV CUR MEDS BY ELIG CLIN: HCPCS | Performed by: NURSE PRACTITIONER

## 2025-04-02 PROCEDURE — G8399 PT W/DXA RESULTS DOCUMENT: HCPCS | Performed by: NURSE PRACTITIONER

## 2025-04-02 PROCEDURE — G8419 CALC BMI OUT NRM PARAM NOF/U: HCPCS | Performed by: NURSE PRACTITIONER

## 2025-04-02 PROCEDURE — 1123F ACP DISCUSS/DSCN MKR DOCD: CPT | Performed by: NURSE PRACTITIONER

## 2025-04-02 PROCEDURE — 1159F MED LIST DOCD IN RCRD: CPT | Performed by: NURSE PRACTITIONER

## 2025-04-02 PROCEDURE — 1090F PRES/ABSN URINE INCON ASSESS: CPT | Performed by: NURSE PRACTITIONER

## 2025-04-02 PROCEDURE — 3017F COLORECTAL CA SCREEN DOC REV: CPT | Performed by: NURSE PRACTITIONER

## 2025-04-02 PROCEDURE — 99214 OFFICE O/P EST MOD 30 MIN: CPT | Performed by: NURSE PRACTITIONER

## 2025-04-02 PROCEDURE — 4004F PT TOBACCO SCREEN RCVD TLK: CPT | Performed by: NURSE PRACTITIONER

## 2025-04-02 PROCEDURE — 3079F DIAST BP 80-89 MM HG: CPT | Performed by: NURSE PRACTITIONER

## 2025-04-02 SDOH — ECONOMIC STABILITY: FOOD INSECURITY: WITHIN THE PAST 12 MONTHS, THE FOOD YOU BOUGHT JUST DIDN'T LAST AND YOU DIDN'T HAVE MONEY TO GET MORE.: NEVER TRUE

## 2025-04-02 SDOH — ECONOMIC STABILITY: FOOD INSECURITY: WITHIN THE PAST 12 MONTHS, YOU WORRIED THAT YOUR FOOD WOULD RUN OUT BEFORE YOU GOT MONEY TO BUY MORE.: NEVER TRUE

## 2025-04-02 ASSESSMENT — PATIENT HEALTH QUESTIONNAIRE - PHQ9
SUM OF ALL RESPONSES TO PHQ QUESTIONS 1-9: 1
1. LITTLE INTEREST OR PLEASURE IN DOING THINGS: SEVERAL DAYS
SUM OF ALL RESPONSES TO PHQ QUESTIONS 1-9: 1
2. FEELING DOWN, DEPRESSED OR HOPELESS: NOT AT ALL
SUM OF ALL RESPONSES TO PHQ QUESTIONS 1-9: 1
SUM OF ALL RESPONSES TO PHQ QUESTIONS 1-9: 1

## 2025-04-02 ASSESSMENT — ENCOUNTER SYMPTOMS
ABDOMINAL PAIN: 0
CHEST TIGHTNESS: 0
COUGH: 0
SHORTNESS OF BREATH: 0
RHINORRHEA: 0
SORE THROAT: 0
ABDOMINAL DISTENTION: 0
COLOR CHANGE: 0

## 2025-04-02 NOTE — ASSESSMENT & PLAN NOTE
Chronic, not at goal (unstable), continue current treatment plan Labs ordered today will see if TSH has improved since last visit, may increase synthroid.     Orders:    TSH reflex to FT4; Future    T3, Free; Future

## 2025-04-02 NOTE — ASSESSMENT & PLAN NOTE
Chronic, at goal (stable), continue current treatment plan    Orders:    Comprehensive Metabolic Panel; Future    CBC with Auto Differential; Future    Urinalysis with Reflex to Culture; Future    Albumin/Creatinine Ratio, Urine; Future    Hemoglobin A1C; Future

## 2025-04-02 NOTE — ASSESSMENT & PLAN NOTE
Chronic, at goal (stable), continue current treatment plan CT lung ordered in past. Advised to get this with head CT.

## 2025-04-02 NOTE — PROGRESS NOTES
[facility] PROFESSIONAL SERVS  [facility]  [address]  [facility] [address]  Dept: [phone]  Loc: [phone]    25      Mariluz Moreno (:  1960) is a 64 y.o. female here for evaluation of the following chief complaint(s):  Mental Health Problem       HPI  Patient here for follow up and evaluation. Family with her today. Report over the last couple of months patient has been fabricating stories. One specific story involves a light fixture that wasn't working. She told her family that she had called her land lord to get it fixed, when in fact she had not. Patient reports telling them this to get them off of her back. Family also notes once telling a family member that another family member was in the hospital and sick, when in fact this was not true. Patient does not know why she said this, but apparently knew she was telling a lie. Patient also reports increase in anger and irritability. Reports when things done work she throws them. When she can't open things she breaks them. Reports yelling at her grandchildren when they misbehave. Family reports this is a significant change in personality. Patient agrees with increase in irritability. Patient denies any confusion or memory problems. She denies any numbness or weakness. She does report some difficulty with hand strength, and ability getting down and cleaning low objects in her house. She reports shame due to cleanliness of house. Patient reports she continues to smoke 1 pack of cigarettes daily. She reports continuing to take medications as prescribed.   Assessment & Plan  Irritability and anger   New, uncertain prognosis, Lab work and imaging ordered. Will consider some counseling for anger management if no metabolic of physiologic causes found. May prescribe Seroquel.     Orders:    Comprehensive Metabolic Panel; Future    CBC with Auto Differential; Future    CT HEAD WO CONTRAST;

## 2025-04-03 ENCOUNTER — RESULTS FOLLOW-UP (OUTPATIENT)
Dept: FAMILY MEDICINE CLINIC | Age: 65
End: 2025-04-03

## 2025-04-03 DIAGNOSIS — E03.4 HYPOTHYROIDISM DUE TO ACQUIRED ATROPHY OF THYROID: Primary | ICD-10-CM

## 2025-04-03 RX ORDER — LEVOTHYROXINE SODIUM 150 UG/1
150 TABLET ORAL DAILY
Qty: 90 TABLET | Refills: 0 | Status: SHIPPED | OUTPATIENT
Start: 2025-04-03 | End: 2025-06-04

## 2025-04-04 ENCOUNTER — LAB (OUTPATIENT)
Dept: LAB | Age: 65
End: 2025-04-04

## 2025-04-04 LAB
BACTERIA URNS QL MICRO: ABNORMAL /HPF
BILIRUB UR QL STRIP.AUTO: NEGATIVE
CASTS #/AREA URNS LPF: ABNORMAL /LPF
CASTS 2: ABNORMAL /LPF
CHARACTER UR: CLEAR
COLOR, UA: YELLOW
CREAT UR-MCNC: 57.1 MG/DL
CRYSTALS URNS MICRO: ABNORMAL
EPITHELIAL CELLS, UA: ABNORMAL /HPF
GLUCOSE UR QL STRIP.AUTO: NEGATIVE MG/DL
HGB UR QL STRIP.AUTO: ABNORMAL
KETONES UR QL STRIP.AUTO: NEGATIVE
MICROALBUMIN UR-MCNC: < 2 MG/DL
MICROALBUMIN/CREAT RATIO PNL UR: 35 MG/G (ref 0–30)
MISCELLANEOUS 2: ABNORMAL
NITRITE UR QL STRIP: POSITIVE
PH UR STRIP.AUTO: 6.5 [PH] (ref 5–9)
PROT UR STRIP.AUTO-MCNC: NEGATIVE MG/DL
RBC URINE: ABNORMAL /HPF
RENAL EPI CELLS #/AREA URNS HPF: ABNORMAL /[HPF]
SP GR UR REFRACT.AUTO: 1.01 (ref 1–1.03)
UROBILINOGEN, URINE: 0.2 EU/DL (ref 0–1)
WBC #/AREA URNS HPF: ABNORMAL /HPF
WBC #/AREA URNS HPF: ABNORMAL /[HPF]
YEAST LIKE FUNGI URNS QL MICRO: ABNORMAL

## 2025-04-06 LAB
BACTERIA UR CULT: ABNORMAL
ORGANISM: ABNORMAL

## 2025-04-07 ENCOUNTER — RESULTS FOLLOW-UP (OUTPATIENT)
Dept: FAMILY MEDICINE CLINIC | Age: 65
End: 2025-04-07

## 2025-04-07 DIAGNOSIS — N39.0 URINARY TRACT INFECTION WITHOUT HEMATURIA, SITE UNSPECIFIED: Primary | ICD-10-CM

## 2025-04-07 DIAGNOSIS — E78.2 MIXED HYPERLIPIDEMIA: ICD-10-CM

## 2025-04-07 LAB
BACTERIA UR CULT: ABNORMAL
ORGANISM: ABNORMAL

## 2025-04-07 RX ORDER — CEPHALEXIN 500 MG/1
500 CAPSULE ORAL 2 TIMES DAILY
Qty: 14 CAPSULE | Refills: 0 | Status: SHIPPED | OUTPATIENT
Start: 2025-04-07 | End: 2025-04-14

## 2025-04-07 RX ORDER — ATORVASTATIN CALCIUM 10 MG/1
10 TABLET, FILM COATED ORAL DAILY
Qty: 90 TABLET | Refills: 3 | Status: SHIPPED | OUTPATIENT
Start: 2025-04-07

## 2025-04-07 NOTE — TELEPHONE ENCOUNTER
[address] [address]  OUTPATIENT OCCUPATIONAL THERAPY  Daily Note  Cheo Carl                        Date: 2020  Patient Name: Eric Koyanagi        CSN: [id number]   : 1957  (57 y.o.)  Gender: female   Referring Practitioner: Nikki XIONG  Diagnosis: S42.255D - Closed nondisplaced fracture of greater tuberosity of left humerus with routine healing, subsequent encounter, S46.012A - Traumatic tear of left rotator cuff, unspecified tear extent, initial encounter          General:  OT Visit Information  Onset Date: 19  OT Insurance Information: Medicare - no ionto, no hot/cold packs  Total # of Visits to Date: 8  Certification Period Expiration Date: 20  Progress Note Counter: 8/10 for PN  Comments: Return to referring physician on 20       Restrictions/Precautions:  Restrictions/Precautions: General Precautions    Position Activity Restriction  Other position/activity restrictions: Surgery on 2019 - follow protocol for Dr. Teri Cain, [facility] of L4-L5 fusion         Subjective:  Subjective: Patient wants to try to go without the tape this visit. Pain:  Patient Currently in Pain: Yes  Pain Assessment: 0-10  Pain Level: 3  Pain Location: Shoulder  Pain Orientation: Left       Objective:     Upper Extremity Function  UE AROM: scapular retraction and backward shoulder circles x 15 reps each  UE PROM: pendulums with left UE x 10 reps each; bilateral table slides for shoulder flexion x 15 reps; pulleys for shoulder flexion x 15 reps; supine PROM to left shoulder for flexion and ER at side to patient tolerance  UE Stretching: STM to upper arm to decrease tightness and increase motion                 Activity Tolerance: Additional Comments:  Tolerated session well    Assessment:  Assessment: Progressing toward goals    Patient Education:  Patient Education: bring new script from physician             Plan: continue with current POC        JANE Kan, OTR/L Mariluz Stevenbert needs refill of   Requested Prescriptions     Pending Prescriptions Disp Refills    atorvastatin (LIPITOR) 10 MG tablet [Pharmacy Med Name: Atorvastatin Calcium 10 MG Oral Tablet] 90 tablet 3     Sig: TAKE 1 TABLET BY MOUTH DAILY       Last Filled on:  02/11/2025 #30 R-2 sent to [facility] by Abhijit Smith CNP.     Last Visit Date:  4/2/2025    Next Visit Date:  4/30/2025    [id number]

## 2025-04-14 DIAGNOSIS — K21.00 GASTROESOPHAGEAL REFLUX DISEASE WITH ESOPHAGITIS WITHOUT HEMORRHAGE: ICD-10-CM

## 2025-04-14 RX ORDER — OMEPRAZOLE 20 MG/1
20 CAPSULE, DELAYED RELEASE ORAL DAILY
Qty: 90 CAPSULE | Refills: 3 | Status: SHIPPED | OUTPATIENT
Start: 2025-04-14

## 2025-04-14 NOTE — TELEPHONE ENCOUNTER
Mariluz JOE Josh needs refill of   Requested Prescriptions     Pending Prescriptions Disp Refills    omeprazole (PRILOSEC) 20 MG delayed release capsule [Pharmacy Med Name: Omeprazole 20 MG Oral Capsule Delayed Release] 90 capsule 3     Sig: TAKE 1 CAPSULE BY MOUTH DAILY       Last Filled on:  02/11/2025 #30 R-2 sent to [facility] by Abhijit Smith CNP.     Last Visit Date:  4/2/2025    Next Visit Date:  4/30/2025    Yes

## 2025-04-17 ENCOUNTER — HOSPITAL ENCOUNTER (OUTPATIENT)
Dept: CT IMAGING | Age: 65
Discharge: HOME | End: 2025-04-17
Attending: NURSE PRACTITIONER
Payer: MEDICARE

## 2025-04-17 ENCOUNTER — HOSPITAL ENCOUNTER (OUTPATIENT)
Dept: WOMENS IMAGING | Age: 65
Discharge: HOME | End: 2025-04-17
Attending: NURSE PRACTITIONER
Payer: MEDICARE

## 2025-04-17 DIAGNOSIS — Z87.891 PERSONAL HISTORY OF TOBACCO USE: ICD-10-CM

## 2025-04-17 DIAGNOSIS — R45.4 IRRITABILITY AND ANGER: ICD-10-CM

## 2025-04-17 DIAGNOSIS — Z78.0 POST-MENOPAUSAL: ICD-10-CM

## 2025-04-17 DIAGNOSIS — M85.859 OSTEOPENIA OF NECK OF FEMUR, UNSPECIFIED LATERALITY: ICD-10-CM

## 2025-04-17 DIAGNOSIS — Z12.2 SCREENING FOR LUNG CANCER: ICD-10-CM

## 2025-04-17 PROCEDURE — 77080 DXA BONE DENSITY AXIAL: CPT

## 2025-04-17 PROCEDURE — 71271 CT THORAX LUNG CANCER SCR C-: CPT

## 2025-04-17 PROCEDURE — 70450 CT HEAD/BRAIN W/O DYE: CPT

## 2025-04-18 ENCOUNTER — RESULTS FOLLOW-UP (OUTPATIENT)
Dept: FAMILY MEDICINE CLINIC | Age: 65
End: 2025-04-18

## 2025-04-30 ENCOUNTER — RESULTS FOLLOW-UP (OUTPATIENT)
Dept: FAMILY MEDICINE CLINIC | Age: 65
End: 2025-04-30

## 2025-04-30 ENCOUNTER — OFFICE VISIT (OUTPATIENT)
Dept: FAMILY MEDICINE CLINIC | Age: 65
End: 2025-04-30
Payer: MEDICARE

## 2025-04-30 ENCOUNTER — LAB (OUTPATIENT)
Dept: LAB | Age: 65
End: 2025-04-30

## 2025-04-30 VITALS
WEIGHT: 151.25 LBS | HEIGHT: 65 IN | BODY MASS INDEX: 25.2 KG/M2 | OXYGEN SATURATION: 99 % | TEMPERATURE: 97.6 F | DIASTOLIC BLOOD PRESSURE: 104 MMHG | HEART RATE: 85 BPM | SYSTOLIC BLOOD PRESSURE: 170 MMHG

## 2025-04-30 DIAGNOSIS — M85.852 OSTEOPENIA OF BOTH HIPS: ICD-10-CM

## 2025-04-30 DIAGNOSIS — M85.851 OSTEOPENIA OF BOTH HIPS: ICD-10-CM

## 2025-04-30 DIAGNOSIS — R09.89 CHOKING SENSATION: ICD-10-CM

## 2025-04-30 DIAGNOSIS — E55.9 VITAMIN D INSUFFICIENCY: Primary | ICD-10-CM

## 2025-04-30 DIAGNOSIS — E03.4 HYPOTHYROIDISM DUE TO ACQUIRED ATROPHY OF THYROID: ICD-10-CM

## 2025-04-30 DIAGNOSIS — N18.4 CHRONIC KIDNEY DISEASE, STAGE 4 (SEVERE) (HCC): ICD-10-CM

## 2025-04-30 DIAGNOSIS — E03.9 ACQUIRED HYPOTHYROIDISM: ICD-10-CM

## 2025-04-30 DIAGNOSIS — I10 BENIGN ESSENTIAL HTN: ICD-10-CM

## 2025-04-30 DIAGNOSIS — Z00.00 MEDICARE ANNUAL WELLNESS VISIT, SUBSEQUENT: Primary | ICD-10-CM

## 2025-04-30 LAB — 25(OH)D3 SERPL-MCNC: 28 NG/ML (ref 30–100)

## 2025-04-30 PROCEDURE — 1160F RVW MEDS BY RX/DR IN RCRD: CPT | Performed by: NURSE PRACTITIONER

## 2025-04-30 PROCEDURE — 90677 PCV20 VACCINE IM: CPT | Performed by: NURSE PRACTITIONER

## 2025-04-30 PROCEDURE — 3080F DIAST BP >= 90 MM HG: CPT | Performed by: NURSE PRACTITIONER

## 2025-04-30 PROCEDURE — G0439 PPPS, SUBSEQ VISIT: HCPCS | Performed by: NURSE PRACTITIONER

## 2025-04-30 PROCEDURE — 1123F ACP DISCUSS/DSCN MKR DOCD: CPT | Performed by: NURSE PRACTITIONER

## 2025-04-30 PROCEDURE — 3017F COLORECTAL CA SCREEN DOC REV: CPT | Performed by: NURSE PRACTITIONER

## 2025-04-30 PROCEDURE — 1159F MED LIST DOCD IN RCRD: CPT | Performed by: NURSE PRACTITIONER

## 2025-04-30 PROCEDURE — G0009 ADMIN PNEUMOCOCCAL VACCINE: HCPCS | Performed by: NURSE PRACTITIONER

## 2025-04-30 PROCEDURE — 3077F SYST BP >= 140 MM HG: CPT | Performed by: NURSE PRACTITIONER

## 2025-04-30 RX ORDER — ERGOCALCIFEROL 1.25 MG/1
50000 CAPSULE, LIQUID FILLED ORAL WEEKLY
Qty: 12 CAPSULE | Refills: 1 | Status: SHIPPED | OUTPATIENT
Start: 2025-04-30

## 2025-04-30 SDOH — HEALTH STABILITY: PHYSICAL HEALTH: ON AVERAGE, HOW MANY DAYS PER WEEK DO YOU ENGAGE IN MODERATE TO STRENUOUS EXERCISE (LIKE A BRISK WALK)?: 2 DAYS

## 2025-04-30 SDOH — HEALTH STABILITY: PHYSICAL HEALTH: ON AVERAGE, HOW MANY MINUTES DO YOU ENGAGE IN EXERCISE AT THIS LEVEL?: 40 MIN

## 2025-04-30 ASSESSMENT — LIFESTYLE VARIABLES
HOW OFTEN DO YOU HAVE A DRINK CONTAINING ALCOHOL: 1
HOW MANY STANDARD DRINKS CONTAINING ALCOHOL DO YOU HAVE ON A TYPICAL DAY: 0
HOW OFTEN DO YOU HAVE A DRINK CONTAINING ALCOHOL: NEVER
HOW OFTEN DO YOU HAVE SIX OR MORE DRINKS ON ONE OCCASION: 1
HOW MANY STANDARD DRINKS CONTAINING ALCOHOL DO YOU HAVE ON A TYPICAL DAY: PATIENT DOES NOT DRINK

## 2025-04-30 ASSESSMENT — PATIENT HEALTH QUESTIONNAIRE - PHQ9
SUM OF ALL RESPONSES TO PHQ QUESTIONS 1-9: 0
1. LITTLE INTEREST OR PLEASURE IN DOING THINGS: NOT AT ALL
2. FEELING DOWN, DEPRESSED OR HOPELESS: NOT AT ALL
SUM OF ALL RESPONSES TO PHQ QUESTIONS 1-9: 0

## 2025-04-30 NOTE — PROGRESS NOTES
Medicare Annual Wellness Visit    Mariluz Moreno is here for Follow-up (Discussion of test results ) and Medicare AWV    Assessment & Plan   Assessment & Plan  Medicare annual wellness visit, subsequent   Patient reports doing better since last visit. Notes decreased anger, decreased story telling. Better energy. Will update pneumonia vaccine.   Orders:    Pneumococcal, PCV20, PREVNAR 20, (age 6w+), IM, PF    Choking sensation   Has had a couple instances of chocking while eating. Family reports normally happens when trying to talk while eating. Has history of GERD in the past. Smokes cigarettes daily. Will refer to GI for evaluation of possible esophageal acolasia.  Also needs colonoscopy for colon cancer screening.    Orders:    Jayda Cortez MD, Gastroenterology, [place]    Chronic kidney disease, stage 4 (severe) (HCC)   Chronic, at goal (stable), continue current treatment plan         Hypothyroidism due to acquired atrophy of thyroid   Chronic, not at goal (unstable), continue current treatment plan  Will obtain repeat level in 6 months        Benign essential HTN   Chronic, not at goal (unstable), diet and lifestyle modifications recommended Will begin to monitor at home, if continued to be elevated will start lisinopril          Osteopenia of both hips   New, uncertain prognosis, diet and lifestyle modifications recommended will check vitamin d level, calcium level normal.     Orders:    Vitamin D 25 Hydroxy; Future         Return in 3 months (on 7/30/2025).     Subjective   The following acute and/or chronic problems were also addressed today:  Doing somewhat better since previous visit. Increased synthroid, treated uti, head ct negative. Daughter still concerned about patient telling stories. Concern for frontal lobe dementia. Seems to be more behavioral, patient has concerns about independence so tells lies to keep children off back. Will continue to monitor behavior and refer to Neuro if still an

## 2025-04-30 NOTE — PROGRESS NOTES
Immunizations Administered       Name Date Dose Route    Pneumococcal, PCV20, PREVNAR 20, (age 6w+), IM, 0.5mL 4/30/2025 0.5 mL Intramuscular    Site: Deltoid- Left    Lot: [id number]    NDC: 6674-2929-97

## 2025-04-30 NOTE — ASSESSMENT & PLAN NOTE
Chronic, not at goal (unstable), continue current treatment plan  Will obtain repeat level in 6 months

## 2025-05-01 LAB — T3FREE SERPL-MCNC: 2.96 PG/ML (ref 2–4.4)

## 2025-06-04 ENCOUNTER — TELEPHONE (OUTPATIENT)
Dept: FAMILY MEDICINE CLINIC | Age: 65
End: 2025-06-04

## 2025-06-04 DIAGNOSIS — E03.4 HYPOTHYROIDISM DUE TO ACQUIRED ATROPHY OF THYROID: ICD-10-CM

## 2025-06-04 RX ORDER — LEVOTHYROXINE SODIUM 150 UG/1
150 TABLET ORAL DAILY
Qty: 90 TABLET | Refills: 3 | Status: SHIPPED | OUTPATIENT
Start: 2025-06-04

## 2025-06-04 NOTE — TELEPHONE ENCOUNTER
The patient called and said that she never received the vitamin d prescription. I let her know that was sent to [facility] and she knew that. I told her that I would give them a call and call her back. She voiced understanding.

## 2025-06-04 NOTE — TELEPHONE ENCOUNTER
I called the patient back and let her know what [facility] said. She is ok with paying for it out of pocket. I let her know that she will need give them a call and let them know and gave her the phone number that was provided to me. She voiced understanding and wrote it down.

## 2025-06-04 NOTE — TELEPHONE ENCOUNTER
I called [facility] and asked about the vitamin d and they said that they reached out to the patient because her insurance will not pay for it. I asked what the patient's cost would be and she said $23.66 for 84 days. I told her that I would let the patient know and if she wants to pay out of pocket what number does she need to call and she said [phone].